# Patient Record
Sex: FEMALE | Race: OTHER | Employment: UNEMPLOYED | URBAN - METROPOLITAN AREA
[De-identification: names, ages, dates, MRNs, and addresses within clinical notes are randomized per-mention and may not be internally consistent; named-entity substitution may affect disease eponyms.]

---

## 2019-08-29 ENCOUNTER — TELEPHONE (OUTPATIENT)
Dept: OBGYN | Age: 24
End: 2019-08-29

## 2019-11-11 ENCOUNTER — APPOINTMENT (OUTPATIENT)
Dept: ULTRASOUND IMAGING | Age: 24
End: 2019-11-11
Payer: MEDICAID

## 2019-11-11 ENCOUNTER — HOSPITAL ENCOUNTER (EMERGENCY)
Age: 24
Discharge: HOME | End: 2019-11-11
Attending: EMERGENCY MEDICINE
Payer: MEDICAID

## 2019-11-11 VITALS
DIASTOLIC BLOOD PRESSURE: 88 MMHG | TEMPERATURE: 98.1 F | OXYGEN SATURATION: 100 % | RESPIRATION RATE: 16 BRPM | SYSTOLIC BLOOD PRESSURE: 128 MMHG | HEART RATE: 81 BPM

## 2019-11-11 DIAGNOSIS — N93.9 VAGINAL BLEEDING: Primary | ICD-10-CM

## 2019-11-11 LAB
-: ABNORMAL
ABSOLUTE EOS #: 0.26 K/UL (ref 0–0.44)
ABSOLUTE IMMATURE GRANULOCYTE: <0.03 K/UL (ref 0–0.3)
ABSOLUTE LYMPH #: 1.6 K/UL (ref 1.1–3.7)
ABSOLUTE MONO #: 0.73 K/UL (ref 0.1–1.2)
AMORPHOUS: ABNORMAL
BACTERIA: ABNORMAL
BASOPHILS # BLD: 1 % (ref 0–2)
BASOPHILS ABSOLUTE: 0.03 K/UL (ref 0–0.2)
BILIRUBIN URINE: NEGATIVE
CASTS UA: ABNORMAL /LPF (ref 0–2)
CASTS UA: ABNORMAL /LPF (ref 0–2)
COLOR: YELLOW
COMMENT UA: ABNORMAL
CRYSTALS, UA: ABNORMAL /HPF
DIFFERENTIAL TYPE: NORMAL
DIRECT EXAM: ABNORMAL
EOSINOPHILS RELATIVE PERCENT: 4 % (ref 1–4)
EPITHELIAL CELLS UA: ABNORMAL /HPF (ref 0–5)
GLUCOSE URINE: NEGATIVE
HCG QUALITATIVE: NEGATIVE
HCT VFR BLD CALC: 42.2 % (ref 36.3–47.1)
HEMOGLOBIN: 12.9 G/DL (ref 11.9–15.1)
IMMATURE GRANULOCYTES: 0 %
KETONES, URINE: NEGATIVE
LEUKOCYTE ESTERASE, URINE: ABNORMAL
LYMPHOCYTES # BLD: 26 % (ref 24–43)
Lab: ABNORMAL
MCH RBC QN AUTO: 26 PG (ref 25.2–33.5)
MCHC RBC AUTO-ENTMCNC: 30.6 G/DL (ref 28.4–34.8)
MCV RBC AUTO: 85.1 FL (ref 82.6–102.9)
MONOCYTES # BLD: 12 % (ref 3–12)
MUCUS: ABNORMAL
NITRITE, URINE: NEGATIVE
NRBC AUTOMATED: 0 PER 100 WBC
OTHER OBSERVATIONS UA: ABNORMAL
PDW BLD-RTO: 12.1 % (ref 11.8–14.4)
PH UA: 5.5 (ref 5–8)
PLATELET # BLD: 280 K/UL (ref 138–453)
PLATELET ESTIMATE: NORMAL
PMV BLD AUTO: 10.8 FL (ref 8.1–13.5)
PROTEIN UA: NEGATIVE
RBC # BLD: 4.96 M/UL (ref 3.95–5.11)
RBC # BLD: NORMAL 10*6/UL
RBC UA: ABNORMAL /HPF (ref 0–2)
RENAL EPITHELIAL, UA: ABNORMAL /HPF
SEG NEUTROPHILS: 57 % (ref 36–65)
SEGMENTED NEUTROPHILS ABSOLUTE COUNT: 3.43 K/UL (ref 1.5–8.1)
SPECIFIC GRAVITY UA: 1.02 (ref 1–1.03)
SPECIMEN DESCRIPTION: ABNORMAL
TRICHOMONAS: ABNORMAL
TURBIDITY: CLEAR
URINE HGB: ABNORMAL
UROBILINOGEN, URINE: NORMAL
WBC # BLD: 6.1 K/UL (ref 3.5–11.3)
WBC # BLD: NORMAL 10*3/UL
WBC UA: ABNORMAL /HPF (ref 0–5)
YEAST: ABNORMAL

## 2019-11-11 PROCEDURE — 87480 CANDIDA DNA DIR PROBE: CPT

## 2019-11-11 PROCEDURE — 93976 VASCULAR STUDY: CPT

## 2019-11-11 PROCEDURE — 87591 N.GONORRHOEAE DNA AMP PROB: CPT

## 2019-11-11 PROCEDURE — 87660 TRICHOMONAS VAGIN DIR PROBE: CPT

## 2019-11-11 PROCEDURE — 81001 URINALYSIS AUTO W/SCOPE: CPT

## 2019-11-11 PROCEDURE — 87491 CHLMYD TRACH DNA AMP PROBE: CPT

## 2019-11-11 PROCEDURE — 85025 COMPLETE CBC W/AUTO DIFF WBC: CPT

## 2019-11-11 PROCEDURE — 6370000000 HC RX 637 (ALT 250 FOR IP): Performed by: STUDENT IN AN ORGANIZED HEALTH CARE EDUCATION/TRAINING PROGRAM

## 2019-11-11 PROCEDURE — 87086 URINE CULTURE/COLONY COUNT: CPT

## 2019-11-11 PROCEDURE — 99284 EMERGENCY DEPT VISIT MOD MDM: CPT

## 2019-11-11 PROCEDURE — 84703 CHORIONIC GONADOTROPIN ASSAY: CPT

## 2019-11-11 PROCEDURE — 87510 GARDNER VAG DNA DIR PROBE: CPT

## 2019-11-11 PROCEDURE — 76830 TRANSVAGINAL US NON-OB: CPT

## 2019-11-11 RX ORDER — IBUPROFEN 400 MG/1
400 TABLET ORAL ONCE
Status: COMPLETED | OUTPATIENT
Start: 2019-11-11 | End: 2019-11-11

## 2019-11-11 RX ORDER — CEPHALEXIN 500 MG/1
500 CAPSULE ORAL 2 TIMES DAILY
Qty: 14 CAPSULE | Refills: 0 | Status: SHIPPED | OUTPATIENT
Start: 2019-11-11 | End: 2019-11-18

## 2019-11-11 RX ORDER — METRONIDAZOLE 500 MG/1
500 TABLET ORAL 2 TIMES DAILY
Qty: 14 TABLET | Refills: 0 | Status: SHIPPED | OUTPATIENT
Start: 2019-11-11 | End: 2019-11-18

## 2019-11-11 RX ADMIN — IBUPROFEN 400 MG: 400 TABLET, FILM COATED ORAL at 16:45

## 2019-11-11 ASSESSMENT — ENCOUNTER SYMPTOMS
DIARRHEA: 0
ABDOMINAL DISTENTION: 0
SHORTNESS OF BREATH: 0
NAUSEA: 1
VOMITING: 1
CONSTIPATION: 0
ABDOMINAL PAIN: 1

## 2019-11-11 ASSESSMENT — PAIN SCALES - GENERAL
PAINLEVEL_OUTOF10: 4
PAINLEVEL_OUTOF10: 6

## 2019-11-11 ASSESSMENT — PAIN DESCRIPTION - LOCATION: LOCATION: ABDOMEN

## 2019-11-12 LAB
CULTURE: NORMAL
Lab: NORMAL
SPECIMEN DESCRIPTION: NORMAL

## 2019-11-13 LAB
C TRACH DNA GENITAL QL NAA+PROBE: NEGATIVE
N. GONORRHOEAE DNA: NEGATIVE
SPECIMEN DESCRIPTION: NORMAL

## 2019-11-27 ENCOUNTER — OFFICE VISIT (OUTPATIENT)
Dept: OBGYN | Age: 24
End: 2019-11-27
Payer: MEDICAID

## 2019-11-27 ENCOUNTER — HOSPITAL ENCOUNTER (OUTPATIENT)
Age: 24
Setting detail: SPECIMEN
Discharge: HOME | End: 2019-11-27
Payer: MEDICAID

## 2019-11-27 VITALS
HEIGHT: 65 IN | SYSTOLIC BLOOD PRESSURE: 110 MMHG | HEART RATE: 71 BPM | WEIGHT: 171.5 LBS | BODY MASS INDEX: 28.57 KG/M2 | DIASTOLIC BLOOD PRESSURE: 70 MMHG

## 2019-11-27 DIAGNOSIS — Z97.5 IUD (INTRAUTERINE DEVICE) IN PLACE: Primary | ICD-10-CM

## 2019-11-27 DIAGNOSIS — Z00.00 PREVENTATIVE HEALTH CARE: ICD-10-CM

## 2019-11-27 DIAGNOSIS — Z30.09 FAMILY PLANNING: ICD-10-CM

## 2019-11-27 PROCEDURE — 96372 THER/PROPH/DIAG INJ SC/IM: CPT | Performed by: OBSTETRICS & GYNECOLOGY

## 2019-11-27 PROCEDURE — 1036F TOBACCO NON-USER: CPT | Performed by: OBSTETRICS & GYNECOLOGY

## 2019-11-27 PROCEDURE — 58301 REMOVE INTRAUTERINE DEVICE: CPT | Performed by: OBSTETRICS & GYNECOLOGY

## 2019-11-27 PROCEDURE — 99203 OFFICE O/P NEW LOW 30 MIN: CPT | Performed by: OBSTETRICS & GYNECOLOGY

## 2019-11-27 PROCEDURE — G8419 CALC BMI OUT NRM PARAM NOF/U: HCPCS | Performed by: OBSTETRICS & GYNECOLOGY

## 2019-11-27 PROCEDURE — G0008 ADMIN INFLUENZA VIRUS VAC: HCPCS | Performed by: OBSTETRICS & GYNECOLOGY

## 2019-11-27 PROCEDURE — 95115 IMMUNOTHERAPY ONE INJECTION: CPT | Performed by: OBSTETRICS & GYNECOLOGY

## 2019-11-27 PROCEDURE — G8427 DOCREV CUR MEDS BY ELIG CLIN: HCPCS | Performed by: OBSTETRICS & GYNECOLOGY

## 2019-11-27 PROCEDURE — 90651 9VHPV VACCINE 2/3 DOSE IM: CPT | Performed by: OBSTETRICS & GYNECOLOGY

## 2019-11-27 RX ORDER — PNV NO.95/FERROUS FUM/FOLIC AC 28MG-0.8MG
1 TABLET ORAL DAILY
Qty: 30 TABLET | Refills: 11 | Status: SHIPPED | OUTPATIENT
Start: 2019-11-27 | End: 2021-04-05 | Stop reason: SDUPTHER

## 2019-11-27 SDOH — HEALTH STABILITY: MENTAL HEALTH: HOW OFTEN DO YOU HAVE A DRINK CONTAINING ALCOHOL?: NEVER

## 2019-12-03 LAB — CYTOLOGY REPORT: NORMAL

## 2020-01-28 ENCOUNTER — TELEPHONE (OUTPATIENT)
Dept: OBGYN | Age: 25
End: 2020-01-28

## 2020-01-28 NOTE — TELEPHONE ENCOUNTER
Patient no showed for 1/28/20 appointment at [address] office. Writer left voice message for patient to call the office to reschedule appointment.

## 2020-07-08 ENCOUNTER — TELEPHONE (OUTPATIENT)
Dept: OBGYN | Age: 25
End: 2020-07-08

## 2020-07-13 ENCOUNTER — TELEPHONE (OUTPATIENT)
Dept: OBGYN | Age: 25
End: 2020-07-13

## 2020-07-13 ENCOUNTER — NURSE ONLY (OUTPATIENT)
Dept: OBGYN | Age: 25
End: 2020-07-13
Payer: MEDICAID

## 2020-07-13 VITALS
BODY MASS INDEX: 29.02 KG/M2 | DIASTOLIC BLOOD PRESSURE: 70 MMHG | WEIGHT: 174.4 LBS | TEMPERATURE: 97.2 F | HEART RATE: 72 BPM | SYSTOLIC BLOOD PRESSURE: 121 MMHG

## 2020-07-13 LAB
CONTROL: PRESENT
PREGNANCY TEST URINE, POC: POSITIVE

## 2020-07-13 PROCEDURE — 99211 OFF/OP EST MAY X REQ PHY/QHP: CPT | Performed by: OBSTETRICS & GYNECOLOGY

## 2020-07-13 PROCEDURE — 81025 URINE PREGNANCY TEST: CPT | Performed by: OBSTETRICS & GYNECOLOGY

## 2020-07-13 NOTE — TELEPHONE ENCOUNTER
Patient was seen for a NV today for UPT which was positive. She is requesting treatment for daily vomiting.   Please advise

## 2020-07-13 NOTE — PROGRESS NOTES
Pt present for UPT, LMP 5/27/20 ,UPT results positive, and results given to pt. Patient is c/o vomiting daily. Will request treatment for patient and she would like [facility] [address].

## 2020-07-15 ENCOUNTER — TELEPHONE (OUTPATIENT)
Dept: OBGYN | Age: 25
End: 2020-07-15

## 2020-07-15 NOTE — TELEPHONE ENCOUNTER
Attempted to reach patient as she requested a call regarding morning sickness in the early first trimester. Pt has not has dating and viability u/s yet. LVM for patient.

## 2021-01-18 ENCOUNTER — NURSE ONLY (OUTPATIENT)
Dept: OBGYN | Age: 26
End: 2021-01-18
Payer: MEDICAID

## 2021-01-18 VITALS
DIASTOLIC BLOOD PRESSURE: 75 MMHG | WEIGHT: 171 LBS | SYSTOLIC BLOOD PRESSURE: 127 MMHG | HEART RATE: 86 BPM | BODY MASS INDEX: 28.46 KG/M2 | TEMPERATURE: 97 F

## 2021-01-18 DIAGNOSIS — N91.2 AMENORRHEA: Primary | ICD-10-CM

## 2021-01-18 LAB
CONTROL: PRESENT
PREGNANCY TEST URINE, POC: POSITIVE

## 2021-01-18 PROCEDURE — 81025 URINE PREGNANCY TEST: CPT

## 2021-01-18 PROCEDURE — 99211 OFF/OP EST MAY X REQ PHY/QHP: CPT | Performed by: OBSTETRICS & GYNECOLOGY

## 2021-02-16 ENCOUNTER — TELEPHONE (OUTPATIENT)
Dept: OBGYN | Age: 26
End: 2021-02-16

## 2021-02-17 ENCOUNTER — TELEPHONE (OUTPATIENT)
Dept: OBGYN | Age: 26
End: 2021-02-17

## 2021-02-17 NOTE — TELEPHONE ENCOUNTER
CALLED PATIENT TO RESCHEDULE APPOINTMENT FROM 2/16/2021 DUE TO SNOW EMERGENCY, VOICE MAIL BOX IS NOT SET UP.

## 2021-02-23 ENCOUNTER — ANCILLARY PROCEDURE (OUTPATIENT)
Dept: OBGYN | Age: 26
End: 2021-02-23
Payer: MEDICAID

## 2021-02-23 DIAGNOSIS — Z36.89 ESTABLISH GESTATIONAL AGE, ULTRASOUND: ICD-10-CM

## 2021-02-23 PROCEDURE — 76805 OB US >/= 14 WKS SNGL FETUS: CPT | Performed by: RADIOLOGY

## 2021-02-25 ENCOUNTER — INITIAL PRENATAL (OUTPATIENT)
Dept: OBGYN CLINIC | Age: 26
End: 2021-02-25
Payer: MEDICAID

## 2021-02-25 ENCOUNTER — HOSPITAL ENCOUNTER (OUTPATIENT)
Age: 26
Setting detail: SPECIMEN
Discharge: HOME | End: 2021-02-25
Payer: MEDICAID

## 2021-02-25 VITALS
DIASTOLIC BLOOD PRESSURE: 80 MMHG | SYSTOLIC BLOOD PRESSURE: 132 MMHG | BODY MASS INDEX: 30.12 KG/M2 | HEART RATE: 84 BPM | WEIGHT: 181 LBS

## 2021-02-25 DIAGNOSIS — Z3A.15 15 WEEKS GESTATION OF PREGNANCY: ICD-10-CM

## 2021-02-25 DIAGNOSIS — O09.92 HRP (HIGH RISK PREGNANCY), SECOND TRIMESTER: ICD-10-CM

## 2021-02-25 DIAGNOSIS — O09.92 HRP (HIGH RISK PREGNANCY), SECOND TRIMESTER: Primary | ICD-10-CM

## 2021-02-25 PROCEDURE — G8484 FLU IMMUNIZE NO ADMIN: HCPCS | Performed by: SPECIALIST

## 2021-02-25 PROCEDURE — 99213 OFFICE O/P EST LOW 20 MIN: CPT | Performed by: SPECIALIST

## 2021-02-25 PROCEDURE — G8419 CALC BMI OUT NRM PARAM NOF/U: HCPCS | Performed by: SPECIALIST

## 2021-02-25 PROCEDURE — 1036F TOBACCO NON-USER: CPT | Performed by: SPECIALIST

## 2021-02-25 PROCEDURE — G8427 DOCREV CUR MEDS BY ELIG CLIN: HCPCS | Performed by: SPECIALIST

## 2021-02-25 RX ORDER — DOCUSATE SODIUM 100 MG/1
100 CAPSULE, LIQUID FILLED ORAL 2 TIMES DAILY
Qty: 60 CAPSULE | Refills: 0 | Status: SHIPPED | OUTPATIENT
Start: 2021-02-25 | End: 2021-03-27

## 2021-02-25 RX ORDER — VITAMIN A ACETATE, .BETA.-CAROTENE, ASCORBIC ACID, CHOLECALCIFEROL, .ALPHA.-TOCOPHEROL ACETATE, DL-, THIAMINE MONONITRATE, RIBOFLAVIN, NIACINAMIDE, PYRIDOXINE HYDROCHLORIDE, FOLIC ACID, CYANOCOBALAMIN, CALCIUM CARBONATE, FERROUS FUMARATE, ZINC OXIDE, AND CUPRIC OXIDE 2000; 2000; 120; 400; 22; 1.84; 3; 20; 10; 1; 12; 200; 27; 25; 2 [IU]/1; [IU]/1; MG/1; [IU]/1; MG/1; MG/1; MG/1; MG/1; MG/1; MG/1; UG/1; MG/1; MG/1; MG/1; MG/1
1 TABLET ORAL DAILY
Qty: 30 TABLET | Refills: 5 | Status: SHIPPED | OUTPATIENT
Start: 2021-02-25 | End: 2021-06-14 | Stop reason: SDUPTHER

## 2021-02-25 NOTE — PROGRESS NOTES
Yesika Garrido is a 23 y.o. female 15w3d        OB History    Para Term  AB Living   2       1     SAB TAB Ectopic Molar Multiple Live Births     1              # Outcome Date GA Lbr Valente/2nd Weight Sex Delivery Anes PTL Lv   2 Current            1 TAB                Chief Complaint   Patient presents with    Initial Prenatal Visit            Blood pressure 132/80, pulse 84, weight 181 lb (82.1 kg), last menstrual period 11/15/2020, not currently breastfeeding. The patient was seen and evaluated. There was N/A fetal movements. No contractions or leakage of fluid. Signs and symptoms of  labor as well as labor were reviewed. Dates were reviewed with the patient. The patient will return to the office for her next visit in 1 week. See antepartum flow sheet. There are no active problems to display for this patient. Diagnosis Orders   1. HRP (high risk pregnancy), second trimester  PRENATAL PROFILE I    HIV Screen    TSH with Reflex    Cystic Fibrosis    Sickle cell screen    Prenatal type and screen   2. 15 weeks gestation of pregnancy  PAP SMEAR    VAGINITIS DNA PROBE    C.trachomatis N.gonorrhoeae DNA     Orders Placed This Encounter   Medications    docusate sodium (COLACE) 100 MG capsule     Sig: Take 1 capsule by mouth 2 times daily     Dispense:  60 capsule     Refill:  0    Prenatal Vit-Fe Fumarate-FA (PNV PRENATAL PLUS MULTIVITAMIN) 27-1 MG TABS     Sig: Take 1 tablet by mouth daily     Dispense:  30 tablet     Refill:  5      Patient with chronic hypertension doing well. She complains of constipation. Will treat with Colace. Fetal heart rate auscultated at 154 bpm and fundal height is 17 cm. today. Pap and cultures done today. See Prenatal Physical tab for details. Patient advised to continue taking prenatal vitamins and to rest as necessary. Patient is not working at this time.   Patient was advised of information received from the task force regarding

## 2021-02-26 LAB
ABO/RH: NORMAL
ABSOLUTE EOS #: 0.12 K/UL (ref 0–0.44)
ABSOLUTE IMMATURE GRANULOCYTE: 0.08 K/UL (ref 0–0.3)
ABSOLUTE LYMPH #: 2.33 K/UL (ref 1.1–3.7)
ABSOLUTE MONO #: 0.79 K/UL (ref 0.1–1.2)
AMPHETAMINE SCREEN URINE: NEGATIVE
ANTIBODY SCREEN: NEGATIVE
BARBITURATE SCREEN URINE: NEGATIVE
BASOPHILS # BLD: 0 % (ref 0–2)
BASOPHILS ABSOLUTE: 0.03 K/UL (ref 0–0.2)
BENZODIAZEPINE SCREEN, URINE: NEGATIVE
BILIRUBIN URINE: NEGATIVE
BUPRENORPHINE URINE: NORMAL
C TRACH DNA GENITAL QL NAA+PROBE: NEGATIVE
CANNABINOID SCREEN URINE: NEGATIVE
COCAINE METABOLITE, URINE: NEGATIVE
COLOR: YELLOW
COMMENT UA: NORMAL
DIFFERENTIAL TYPE: ABNORMAL
DIRECT EXAM: NORMAL
EOSINOPHILS RELATIVE PERCENT: 1 % (ref 1–4)
GLUCOSE URINE: NEGATIVE
HCT VFR BLD CALC: 37.6 % (ref 36.3–47.1)
HEMOGLOBIN: 11.5 G/DL (ref 11.9–15.1)
HEPATITIS B SURFACE ANTIGEN: NONREACTIVE
HIV AG/AB: NONREACTIVE
IMMATURE GRANULOCYTES: 1 %
KETONES, URINE: NEGATIVE
LEUKOCYTE ESTERASE, URINE: NEGATIVE
LYMPHOCYTES # BLD: 22 % (ref 24–43)
Lab: NORMAL
MCH RBC QN AUTO: 26.1 PG (ref 25.2–33.5)
MCHC RBC AUTO-ENTMCNC: 30.6 G/DL (ref 28.4–34.8)
MCV RBC AUTO: 85.5 FL (ref 82.6–102.9)
MDMA URINE: NORMAL
METHADONE SCREEN, URINE: NEGATIVE
METHAMPHETAMINE, URINE: NORMAL
MONOCYTES # BLD: 7 % (ref 3–12)
N. GONORRHOEAE DNA: NEGATIVE
NITRITE, URINE: NEGATIVE
NRBC AUTOMATED: 0 PER 100 WBC
OPIATES, URINE: NEGATIVE
OXYCODONE SCREEN URINE: NEGATIVE
PDW BLD-RTO: 12.8 % (ref 11.8–14.4)
PH UA: 6 (ref 5–8)
PHENCYCLIDINE, URINE: NEGATIVE
PLATELET # BLD: 260 K/UL (ref 138–453)
PLATELET ESTIMATE: ABNORMAL
PMV BLD AUTO: 11.6 FL (ref 8.1–13.5)
PROPOXYPHENE, URINE: NORMAL
PROTEIN UA: NEGATIVE
RBC # BLD: 4.4 M/UL (ref 3.95–5.11)
RBC # BLD: ABNORMAL 10*6/UL
RUBV IGG SER QL: 49.4 IU/ML
SEG NEUTROPHILS: 69 % (ref 36–65)
SEGMENTED NEUTROPHILS ABSOLUTE COUNT: 7.51 K/UL (ref 1.5–8.1)
SICKLE CELL SCREEN: NEGATIVE
SPECIFIC GRAVITY UA: 1.01 (ref 1–1.03)
SPECIMEN DESCRIPTION: NORMAL
SPECIMEN DESCRIPTION: NORMAL
T. PALLIDUM, IGG: NONREACTIVE
TEST INFORMATION: NORMAL
THYROXINE, FREE: 1.12 NG/DL (ref 0.93–1.7)
TRICYCLIC ANTIDEPRESSANTS, UR: NORMAL
TSH SERPL DL<=0.05 MIU/L-ACNC: 0.29 MIU/L (ref 0.3–5)
TURBIDITY: CLEAR
URINE HGB: NEGATIVE
UROBILINOGEN, URINE: NORMAL
WBC # BLD: 10.9 K/UL (ref 3.5–11.3)
WBC # BLD: ABNORMAL 10*3/UL

## 2021-02-28 PROBLEM — O09.92 HRP (HIGH RISK PREGNANCY), SECOND TRIMESTER: Status: ACTIVE | Noted: 2021-02-28

## 2021-02-28 PROBLEM — Z3A.15 15 WEEKS GESTATION OF PREGNANCY: Status: ACTIVE | Noted: 2021-02-28

## 2021-03-05 ENCOUNTER — ROUTINE PRENATAL (OUTPATIENT)
Dept: OBGYN CLINIC | Age: 26
End: 2021-03-05
Payer: MEDICAID

## 2021-03-05 ENCOUNTER — HOSPITAL ENCOUNTER (OUTPATIENT)
Age: 26
Setting detail: SPECIMEN
Discharge: HOME | End: 2021-03-05
Payer: MEDICAID

## 2021-03-05 VITALS
HEART RATE: 88 BPM | WEIGHT: 180 LBS | SYSTOLIC BLOOD PRESSURE: 110 MMHG | BODY MASS INDEX: 29.95 KG/M2 | DIASTOLIC BLOOD PRESSURE: 71 MMHG | TEMPERATURE: 97 F

## 2021-03-05 DIAGNOSIS — Z3A.16 16 WEEKS GESTATION OF PREGNANCY: Primary | ICD-10-CM

## 2021-03-05 DIAGNOSIS — Z3A.16 16 WEEKS GESTATION OF PREGNANCY: ICD-10-CM

## 2021-03-05 LAB
CYSTIC FIBROSIS: NORMAL
CYTOLOGY REPORT: NORMAL

## 2021-03-05 PROCEDURE — G8427 DOCREV CUR MEDS BY ELIG CLIN: HCPCS | Performed by: SPECIALIST

## 2021-03-05 PROCEDURE — G8419 CALC BMI OUT NRM PARAM NOF/U: HCPCS | Performed by: SPECIALIST

## 2021-03-05 PROCEDURE — 99211 OFF/OP EST MAY X REQ PHY/QHP: CPT | Performed by: SPECIALIST

## 2021-03-05 PROCEDURE — 36415 COLL VENOUS BLD VENIPUNCTURE: CPT | Performed by: SPECIALIST

## 2021-03-05 NOTE — PROGRESS NOTES
Pt evaluated by RN and found to be doing well,  agree with nurse evaluation  The patient, Hodan David, identity was verified by name and MRN. Chief Complaint   Patient presents with    Routine Prenatal Visit     Patient is 16 weeks and 4 days gestation and is here for supervision of high risk pregnancy.  High Risk Pregnancy    Hypertension    Hypothyroidism     Estimated Date of Delivery: 8/16/21 16w4d  Any problems since last visit:none  Medication list reviewed and active medications noted. Patient is taking medications as directed. Other patient information given: consent maternal Serum Screening. Chaperone:offered, decline. Unable to void at this time. Patient is being seen as a nurse visit due to provider leaving for an emergency at the hospital.  Maternal quad screen collected. Patient has no complaints at this time. Patient encouraged to continue prenatal vitamins and keep next appointment. Patient was in the office today for a Maternal quad screen. Draw per physician order using sterile technique.   Drawn from the Right antecubital.  Pt evaluated by RN and found to be doing well,  agree with nurse evaluation

## 2021-03-09 LAB
AFP INTERPRETATION: NORMAL
AFP MOM: 0.45
AFP QUAD INTERPRETATION: NORMAL
AFP SPECIMEN: NORMAL
AFP: 14 NG/ML
DATE OF BIRTH: NORMAL
DATING METHOD: NORMAL
DETERMINED BY: NORMAL
DIABETIC: NORMAL
DIMERIC INHIBIN A: 129 PG/ML
DONOR EGG?: NORMAL
DUE DATE: NORMAL
ESTIMATED DUE DATE: NORMAL
FAMILY HISTORY NTD: NORMAL
GESTATIONAL AGE: NORMAL
HISTORY OF ANEUPLOIDY?: NO
IN VITRO FERTILIZATION: NO
INHIBIN A MOM: 0.98
INSULIN REQ DIABETES: NO
LAST MENSTRUAL PERIOD: NORMAL
MATERNAL AGE AT EDD: 24.3 YR
MATERNAL WEIGHT: 180
MOM FOR HCG, 2ND TRIMESTER: 2.74
MONOCHORIONIC TWINS: NORMAL
NUMBER OF FETUSES: NORMAL
PATIENT WEIGHT UNITS: NORMAL
PATIENT WEIGHT: NORMAL
PATIENT'S HCG, TRI 2: NORMAL IU/L
PREV TRISOMY PREG: NORMAL
RACE (MATERNAL): NORMAL
RACE: NORMAL
REPEAT SPECIMEN?: NO
SMOKING: NO
SMOKING: NORMAL
UE3 MOM: 0.81
UE3 VALUE: 0.95 NG/ML
VALPROIC/CARBAMAZEP: NORMAL
ZZ NTE CLEAN UP: HISTORY: NO

## 2021-03-10 ENCOUNTER — ROUTINE PRENATAL (OUTPATIENT)
Dept: OBGYN CLINIC | Age: 26
End: 2021-03-10
Payer: MEDICAID

## 2021-03-10 VITALS — TEMPERATURE: 97.3 F

## 2021-03-10 DIAGNOSIS — O28.0 ABNORMAL QUAD SCREEN: ICD-10-CM

## 2021-03-10 DIAGNOSIS — O09.92 HRP (HIGH RISK PREGNANCY), SECOND TRIMESTER: ICD-10-CM

## 2021-03-10 DIAGNOSIS — Z3A.17 17 WEEKS GESTATION OF PREGNANCY: Primary | ICD-10-CM

## 2021-03-10 PROCEDURE — 99213 OFFICE O/P EST LOW 20 MIN: CPT | Performed by: SPECIALIST

## 2021-03-10 PROCEDURE — G8484 FLU IMMUNIZE NO ADMIN: HCPCS | Performed by: SPECIALIST

## 2021-03-10 PROCEDURE — 1036F TOBACCO NON-USER: CPT | Performed by: SPECIALIST

## 2021-03-10 PROCEDURE — G8419 CALC BMI OUT NRM PARAM NOF/U: HCPCS | Performed by: SPECIALIST

## 2021-03-10 PROCEDURE — G8427 DOCREV CUR MEDS BY ELIG CLIN: HCPCS | Performed by: SPECIALIST

## 2021-03-10 NOTE — PATIENT INSTRUCTIONS
Patient Education        Weeks 14 to 25 of Your Pregnancy: Care Instructions  Your Care Instructions     During this time, you may start to \"show,\" so that you look pregnant to people around you. You may also notice some changes in your skin, such as itchy spots on your palms or acne on your face. Your baby is now able to pass urine, and your baby's first stool (meconium) is starting to collect in his or her intestines. Hair is also beginning to grow on your baby's head. At your next visit, between weeks 18 and 20, your doctor may do an ultrasound test. The test allows your doctor to check for certain problems. Your doctor can also tell the sex of your baby. This is a good time to think about whether you want to know whether your baby is a boy or a girl. Talk to your doctor about getting a flu shot to help keep you healthy during your pregnancy. As your pregnancy moves along, it is common to worry or feel anxious. Your body is changing a lot. And you are thinking about giving birth, the health of your baby, and becoming a parent. You can learn to cope with any anxiety and stress you feel. Follow-up care is a key part of your treatment and safety. Be sure to make and go to all appointments, and call your doctor if you are having problems. It's also a good idea to know your test results and keep a list of the medicines you take. How can you care for yourself at home? Reduce stress    · Ask for help with cooking and housekeeping.     · Figure out who or what causes your stress. Avoid these people or situations as much as possible.     · Relax every day. Taking 10- to 15-minute breaks can make a big difference. Take a walk, listen to music, or take a warm bath.     · Learn relaxation techniques at prenatal or yoga class. Or buy a relaxation tape.     · List your fears about having a baby and becoming a parent. Share the list with someone you trust. Decide which worries are really small, and try to let them go. Exercise    · If you did not exercise much before pregnancy, start slowly. Walking is best. [employer], and do a little more every day.     · Brisk walking, easy jogging, low-impact aerobics, water aerobics, and yoga are good choices. Some sports, such as scuba diving, horseback riding, downhill skiing, gymnastics, and water skiing, are not a good idea.     · Try to do at least 2½ hours a week of moderate exercise, such as a fast walk. One way to do this is to be active 30 minutes a day, at least 5 days a week. It's fine to be active in blocks of 10 minutes or more throughout your day and week.     · Wear loose clothing. And wear shoes and a bra that provide good support.     · Warm up and cool down to start and finish your exercise.     · If you want to use weights, be sure to use light weights. They reduce stress on your joints. Stay at the best weight for you    · Experts recommend that you gain about 1 pound a month during the first 3 months of your pregnancy.     · Experts recommend that you gain about 1 pound a week during your last 6 months of pregnancy, for a total weight gain of 25 to 35 pounds.     · If you are underweight, you will need to gain more weight (about 28 to 40 pounds).     · If you are overweight, you may not need to gain as much weight (about 15 to 25 pounds).     · If you are gaining weight too fast, use common sense. Exercise every day, and limit sweets, fast foods, and fats. Choose lean meats, fruits, and vegetables.     · If you are having twins or more, your doctor may refer you to a dietitian. Where can you learn more? Go to https://Patch of Landkameron.healthRedRover. org and sign in to your [employer]Manias account. Enter Q860 in the Flickr box to learn more about \"Weeks 14 to 18 of Your Pregnancy: Care Instructions. \"     If you do not have an account, please click on the \"Sign Up Now\" link.   Current as of: February 11, 2020               Content Version: 12.6  © 3010-5479 Healthwise, Incorporated. Care instructions adapted under license by [facility] ([facility]). If you have questions about a medical condition or this instruction, always ask your healthcare professional. [address] any warranty or liability for your use of this information. Patient Education        Learning About When to Call Your Doctor During Pregnancy (Up to 20 Weeks)  Your Care Instructions     It's common to have concerns about what might be a problem during pregnancy. Although most pregnant women don't have any serious problems, it's important to know when to call your doctor if you have certain symptoms. These are general suggestions. Your doctor may give you some more information about when to call. When to call your doctor (up to 20 weeks)  Call 911 anytime you think you may need emergency care. For example, call if:  · You passed out (lost consciousness). Call your doctor now or seek immediate medical care if:  · You have a fever. · You have vaginal bleeding. · You are dizzy or lightheaded, or you feel like you may faint. · You have symptoms of a urinary tract infection. These may include:  ? Pain or burning when you urinate. ? A frequent need to urinate without being able to pass much urine. ? Pain in the flank, which is just below the rib cage and above the waist on either side of the back. ? Blood in your urine. · You have belly pain. · You think you are having contractions. · You have a sudden release of fluid from your vagina. Watch closely for changes in your health, and be sure to contact your doctor if:  · You have vaginal discharge that smells bad. · You have other concerns about your pregnancy. Follow-up care is a key part of your treatment and safety. Be sure to make and go to all appointments, and call your doctor if you are having problems. It's also a good idea to know your test results and keep a list of the medicines you take.   Where can you learn more?  Go to [email or web address].healthGroup [email or web address] and sign in to your "[employer] account. Enter G801 in the [facility] box to learn more about \"Learning About When to Call Your Doctor During Pregnancy (Up to 20 Weeks). \"     If you do not have an account, please click on the \"Sign Up Now\" link. Current as of: February 11, 2020               Content Version: 12.6  © 2006-2020 [employer], Incorporated. Care instructions adapted under license by [facility] ([facility]). If you have questions about a medical condition or this instruction, always ask your healthcare professional. [address] any warranty or liability for your use of this information.

## 2021-03-10 NOTE — PROGRESS NOTES
Mary Pierre is a 23 y.o. female 17w2d        OB History    Para Term  AB Living   2       1     SAB TAB Ectopic Molar Multiple Live Births     1              # Outcome Date GA Lbr Valente/2nd Weight Sex Delivery Anes PTL Lv   2 Current            1 TAB                No chief complaint on file. Temperature 97.3 °F (36.3 °C), temperature source Infrared, last menstrual period 11/15/2020, not currently breastfeeding. The patient was seen and evaluated. There was N/A fetal movements. No contractions or leakage of fluid. Signs and symptoms of  labor as well as labor were reviewed. Dates were reviewed with the patient. The patient will return to the office for her next visit in 1 week. See antepartum flow sheet. Patient Active Problem List    Diagnosis Date Noted    17 weeks gestation of pregnancy 03/10/2021    Abnormal quad screen 03/10/2021    HRP (high risk pregnancy), second trimester 2021    15 weeks gestation of pregnancy 2021        Diagnosis Orders   1. 17 weeks gestation of pregnancy  Bronwyn Mueller MD, Maternal Fetal Medicine, [place]   2. Abnormal quad screen  Bronwyn Mueller MD, Maternal Fetal Medicine, [place]   3. HRP (high risk pregnancy), second trimester         Patient with abnormal quad screen showing positive for downs syndrome. Explained to patient that this test is only a screening test that allows us to take a closer look at the baby. Discussed that she will be referred to [facility] for further evaluation. All questions were answered. Fetal heart rate auscultated at 146 bpm and fundal height is 17 cm. today. Patient advised to continue taking prenatal vitamins and to rest as necessary. Sharlene Case, am scribing for, and in the presence of Dr. Any Tobar. Electronically signed by: Wilber Fitzgerald 3/10/21 3:52 PM   I agree to the above documentation placed by my scribe Wilber Fitzgerald.  I

## 2021-03-11 ENCOUNTER — TELEPHONE (OUTPATIENT)
Dept: OBGYN CLINIC | Age: 26
End: 2021-03-11

## 2021-03-11 NOTE — TELEPHONE ENCOUNTER
Patient notified of Positive quad screen at appointment on 03/10/2021. Patient referred to [facility].

## 2021-03-11 NOTE — TELEPHONE ENCOUNTER
----- Message from Eren Christina MD sent at 3/9/2021  3:55 PM EST -----  Pt needs ref to [facility]

## 2021-03-17 ENCOUNTER — ROUTINE PRENATAL (OUTPATIENT)
Dept: OBGYN CLINIC | Age: 26
End: 2021-03-17
Payer: MEDICAID

## 2021-03-17 VITALS
SYSTOLIC BLOOD PRESSURE: 151 MMHG | TEMPERATURE: 97 F | WEIGHT: 186 LBS | HEART RATE: 114 BPM | BODY MASS INDEX: 30.95 KG/M2 | DIASTOLIC BLOOD PRESSURE: 85 MMHG

## 2021-03-17 DIAGNOSIS — Z3A.18 18 WEEKS GESTATION OF PREGNANCY: Primary | ICD-10-CM

## 2021-03-17 DIAGNOSIS — O28.0 ABNORMAL QUAD SCREEN: ICD-10-CM

## 2021-03-17 DIAGNOSIS — O09.92 HRP (HIGH RISK PREGNANCY), SECOND TRIMESTER: ICD-10-CM

## 2021-03-17 PROCEDURE — G8419 CALC BMI OUT NRM PARAM NOF/U: HCPCS | Performed by: SPECIALIST

## 2021-03-17 PROCEDURE — 99213 OFFICE O/P EST LOW 20 MIN: CPT | Performed by: SPECIALIST

## 2021-03-17 PROCEDURE — G8484 FLU IMMUNIZE NO ADMIN: HCPCS | Performed by: SPECIALIST

## 2021-03-17 PROCEDURE — 1036F TOBACCO NON-USER: CPT | Performed by: SPECIALIST

## 2021-03-17 PROCEDURE — G8427 DOCREV CUR MEDS BY ELIG CLIN: HCPCS | Performed by: SPECIALIST

## 2021-03-17 RX ORDER — LABETALOL 100 MG/1
100 TABLET, FILM COATED ORAL 2 TIMES DAILY
Qty: 30 TABLET | Refills: 2 | Status: SHIPPED | OUTPATIENT
Start: 2021-03-17 | End: 2021-04-05 | Stop reason: SDUPTHER

## 2021-03-17 NOTE — PATIENT INSTRUCTIONS
Patient Education        Weeks 18 to 22 of Your Pregnancy: Care Instructions  Overview     Your baby is continuing to develop quickly. Sometime between 18 and 22 weeks, you'll probably start to feel your baby move. At first, these small fetal movements feel like fluttering or \"butterflies. \" Some women say that they feel like gas bubbles. As your baby grows, these movements will become stronger. You may also notice that your baby hiccups. Babies at this stage can now suck their thumbs. You may find that your nausea and fatigue are gone. You may feel better overall and have more energy than you did in your first trimester. But you might now also have some new discomforts, like sleep problems or leg cramps. Talk to your doctor about things you can do at home to ease these problems. Follow-up care is a key part of your treatment and safety. Be sure to make and go to all appointments, and call your doctor if you are having problems. It's also a good idea to know your test results and keep a list of the medicines you take. How can you care for yourself at home? Ease sleep problems  · Avoid caffeine in drinks or chocolate late in the day. · Get some exercise every day. · Take a warm shower or bath before bed. · Have a light snack or glass of milk at bedtime. · Do relaxation exercises in bed to calm your mind and body. · Support your legs and back with extra pillows. Try a pillow between your legs if you sleep on your side. · Do not use sleeping pills or alcohol. They could harm your baby. Ease leg cramps  · Do not massage your calf during the cramp. · Sit on a firm bed or chair. Straighten your leg, and bend your foot (flex your ankle) slowly upward, toward your knee. Bend your toes up and down. · Stand on a cool, flat surface. Stretch your toes upward, and take small steps walking on your heels. · Use a heating pad or hot water bottle to help with muscle ache.   Prevent leg cramps  · Be sure to get enough calcium. If you are worried that you are not getting enough, talk to your doctor. · Exercise every day, and stretch your legs before bed. · Take a warm bath before bed, and try leg warmers at night. Where can you learn more? Go to https://iggy.healthKochAbo. org and sign in to your Digilab account. Enter A017 in the [facility] box to learn more about \"Weeks 18 to 22 of Your Pregnancy: Care Instructions. \"     If you do not have an account, please click on the \"Sign Up Now\" link. Current as of: October 8, 2020               Content Version: 12.8  © 8795-7516 [employer]. Care instructions adapted under license by [facility] ([facility]). If you have questions about a medical condition or this instruction, always ask your healthcare professional. [place] [address] any warranty or liability for your use of this information. Patient Education        Learning About When to Call Your Doctor During Pregnancy (Up to 20 Weeks)  Your Care Instructions     It's common to have concerns about what might be a problem during pregnancy. Although most pregnant women don't have any serious problems, it's important to know when to call your doctor if you have certain symptoms. These are general suggestions. Your doctor may give you some more information about when to call. When to call your doctor (up to 20 weeks)  Call 911 anytime you think you may need emergency care. For example, call if:  · You passed out (lost consciousness). Call your doctor now or seek immediate medical care if:  · You have a fever. · You have vaginal bleeding. · You are dizzy or lightheaded, or you feel like you may faint. · You have symptoms of a urinary tract infection. These may include:  ? Pain or burning when you urinate. ? A frequent need to urinate without being able to pass much urine.   ? Pain in the flank, which is just below the rib cage and above the waist on either side of the back.  ? Blood in your urine. · You have belly pain. · You think you are having contractions. · You have a sudden release of fluid from your vagina. Watch closely for changes in your health, and be sure to contact your doctor if:  · You have vaginal discharge that smells bad. · You have other concerns about your pregnancy. Follow-up care is a key part of your treatment and safety. Be sure to make and go to all appointments, and call your doctor if you are having problems. It's also a good idea to know your test results and keep a list of the medicines you take. Where can you learn more? Go to https://Veracity Medical Solutionspepiceweb.healthColumbia Gorge Teen Camps. org and sign in to your [employer] account. Enter L399 in the [employer] box to learn more about \"Learning About When to Call Your Doctor During Pregnancy (Up to 20 Weeks). \"     If you do not have an account, please click on the \"Sign Up Now\" link. Current as of: October 8, 2020               Content Version: 12.8  © 2006-2021 Healthwise, Incorporated. Care instructions adapted under license by [facility] ([facility]). If you have questions about a medical condition or this instruction, always ask your healthcare professional. [place] [address] any warranty or liability for your use of this information.

## 2021-03-17 NOTE — PROGRESS NOTES
Colby Rosales is a 23 y.o. female 18w2d        OB History    Para Term  AB Living   2       1     SAB TAB Ectopic Molar Multiple Live Births     1              # Outcome Date GA Lbr Valente/2nd Weight Sex Delivery Anes PTL Lv   2 Current            1 TAB                Chief Complaint   Patient presents with    Routine Prenatal Visit            Blood pressure (!) 151/85, pulse 114, temperature 97 °F (36.1 °C), weight 186 lb (84.4 kg), last menstrual period 11/15/2020, not currently breastfeeding. The patient was seen and evaluated. There was Positive fetal movements. No contractions or leakage of fluid. Signs and symptoms of  labor as well as labor were reviewed. Dates were reviewed with the patient. The patient will return to the office for her next visit in 1 week. See antepartum flow sheet. Patient Active Problem List    Diagnosis Date Noted    18 weeks gestation of pregnancy 2021    17 weeks gestation of pregnancy 03/10/2021    Abnormal quad screen 03/10/2021    HRP (high risk pregnancy), second trimester 2021    15 weeks gestation of pregnancy 2021        Diagnosis Orders   1. 18 weeks gestation of pregnancy     2. Abnormal quad screen     3. HRP (high risk pregnancy), second trimester       Orders Placed This Encounter   Medications    labetalol (NORMODYNE) 100 MG tablet     Sig: Take 1 tablet by mouth 2 times daily     Dispense:  30 tablet     Refill:  2      Patient with elevated blood pressure. Patient advised to start taking Labetalol. Patient has brought family with her to this appointment and abnormal quad screening was explained again. Patient and family reassured that this is a screening test and that further testing and evaluation needs to be done. She has been scheduled with [address] [place] [address] for evaluation of abnormal quad screening, but not until 2021.   Patient requests referral to a different [facility] specialist because she is very anxious and cannot wait that long to be seen. Fetal heart rate auscultated at 158 bpm and fundal height is 20 cm. today. Patient advised to continue taking prenatal vitamins and to rest as necessary. Bk Brooks am scribing for, and in the presence of Dr. Darryl Ramos. Electronically signed by: Emir Copeland 3/17/21 3:33 PM   I agree to the above documentation placed by my scribe Emir Copeland. I reviewed the scribe's note and agree with the documented findings and plan of care. Any areas of disagreement are noted on the chart. I have personally evaluated this patient. Additional findings are as noted. I agree with the chief complaint, past medical history, past surgical history, allergies, medications, social and family history as documented unless otherwise noted below.      Electronically signed by Darryl Ramos MD on 3/18/2021 at 11:27 AM

## 2021-03-18 ENCOUNTER — TELEPHONE (OUTPATIENT)
Dept: OBGYN CLINIC | Age: 26
End: 2021-03-18

## 2021-03-25 ENCOUNTER — ROUTINE PRENATAL (OUTPATIENT)
Dept: OBGYN CLINIC | Age: 26
End: 2021-03-25
Payer: MEDICAID

## 2021-03-25 VITALS
SYSTOLIC BLOOD PRESSURE: 123 MMHG | BODY MASS INDEX: 30.95 KG/M2 | DIASTOLIC BLOOD PRESSURE: 74 MMHG | WEIGHT: 186 LBS | HEART RATE: 82 BPM

## 2021-03-25 DIAGNOSIS — Z3A.19 19 WEEKS GESTATION OF PREGNANCY: Primary | ICD-10-CM

## 2021-03-25 DIAGNOSIS — O28.0 ABNORMAL QUAD SCREEN: ICD-10-CM

## 2021-03-25 DIAGNOSIS — O09.92 HRP (HIGH RISK PREGNANCY), SECOND TRIMESTER: ICD-10-CM

## 2021-03-25 PROCEDURE — G8427 DOCREV CUR MEDS BY ELIG CLIN: HCPCS | Performed by: SPECIALIST

## 2021-03-25 PROCEDURE — 1036F TOBACCO NON-USER: CPT | Performed by: SPECIALIST

## 2021-03-25 PROCEDURE — G8419 CALC BMI OUT NRM PARAM NOF/U: HCPCS | Performed by: SPECIALIST

## 2021-03-25 PROCEDURE — G8484 FLU IMMUNIZE NO ADMIN: HCPCS | Performed by: SPECIALIST

## 2021-03-25 PROCEDURE — 99213 OFFICE O/P EST LOW 20 MIN: CPT | Performed by: SPECIALIST

## 2021-03-25 RX ORDER — ASPIRIN 81 MG/1
81 TABLET, CHEWABLE ORAL DAILY
Status: ON HOLD | COMMUNITY
Start: 2021-03-19 | End: 2021-07-27 | Stop reason: HOSPADM

## 2021-03-25 RX ORDER — BUTALBITAL, ACETAMINOPHEN AND CAFFEINE 50; 325; 40 MG/1; MG/1; MG/1
1 TABLET ORAL EVERY 4 HOURS PRN
COMMUNITY
Start: 2021-03-19 | End: 2021-05-06 | Stop reason: SDUPTHER

## 2021-03-25 NOTE — PROGRESS NOTES
Chaya Rojas is a 23 y.o. female 19w3d        OB History    Para Term  AB Living   2       1     SAB TAB Ectopic Molar Multiple Live Births     1              # Outcome Date GA Lbr Valente/2nd Weight Sex Delivery Anes PTL Lv   2 Current            1 TAB                Chief Complaint   Patient presents with    High Risk Pregnancy        Blood pressure 123/74, pulse 82, weight 186 lb (84.4 kg), last menstrual period 11/15/2020, not currently breastfeeding. The patient was seen and evaluated. There was positive fetal movements. No contractions or leakage of fluid. Signs and symptoms of  labor as well as labor were reviewed. The patient's anatomy ultrasound has been scheduled at [facility]. The S/S of Pre-Eclampsia were reviewed with the patient in detail. She is to report any of these if they occur. She currently denies any of these. The patient is RH positive Rhogam Ordered: Not indicated. Patient Active Problem List    Diagnosis Date Noted    19 weeks gestation of pregnancy 2021    18 weeks gestation of pregnancy 2021    17 weeks gestation of pregnancy 03/10/2021    Abnormal quad screen 03/10/2021    HRP (high risk pregnancy), second trimester 2021    15 weeks gestation of pregnancy 2021        Diagnosis Orders   1. 19 weeks gestation of pregnancy     2. HRP (high risk pregnancy), second trimester     3. Abnormal quad screen         Patient has been using blood pressure medication as prescribed. She has been evaluated at [facility] for abnormal quad screen and is scheduled for her second trimester ultrasound there. Fetal heart rate auscultated at 152 bpm and fundal height is 19 cm. today. Patient advised to continue taking prenatal vitamins and to rest as necessary. The patient will return to the office for her next visit in 1 week. See antepartum flow sheet.      Lyly Quintero am scribing for, and in the presence of Dr. Anay Baird. Alexis. Electronically signed by: Rupinder Bradley 3/25/21 2:54 PM   I agree to the above documentation placed by my scribe Rupinder Bradley. I reviewed the scribe's note and agree with the documented findings and plan of care. Any areas of disagreement are noted on the chart. I have personally evaluated this patient. Additional findings are as noted. I agree with the chief complaint, past medical history, past surgical history, allergies, medications, social and family history as documented unless otherwise noted below.      Electronically signed by Negro Walker MD on 3/26/2021 at 1:50 AM

## 2021-04-05 DIAGNOSIS — Z30.09 FAMILY PLANNING: ICD-10-CM

## 2021-04-05 RX ORDER — LABETALOL 100 MG/1
100 TABLET, FILM COATED ORAL 2 TIMES DAILY
Qty: 30 TABLET | Refills: 2 | Status: SHIPPED | OUTPATIENT
Start: 2021-04-05 | End: 2021-04-29 | Stop reason: SDUPTHER

## 2021-04-05 RX ORDER — PNV NO.95/FERROUS FUM/FOLIC AC 28MG-0.8MG
1 TABLET ORAL DAILY
Qty: 30 TABLET | Refills: 11 | Status: SHIPPED | OUTPATIENT
Start: 2021-04-05 | End: 2021-05-20

## 2021-04-05 NOTE — TELEPHONE ENCOUNTER
Called in Prenatal vit-fe fumarate-fa 27-0.8 tabs 1 tab by mouth daily #30 and   Normodyne 100 mg tab 1 tab by mouth 2 times daily #60. Called into [facility] [place] @ [phone]. Lm on prescription line @ 4:17pm.  Pt notified.

## 2021-04-05 NOTE — TELEPHONE ENCOUNTER
Patient is in [facility] and needs her medications sent here. She is using [address] [phone]. She is out of medication.   Thanks

## 2021-04-15 ENCOUNTER — TELEPHONE (OUTPATIENT)
Dept: OBGYN CLINIC | Age: 26
End: 2021-04-15

## 2021-04-20 ENCOUNTER — TELEPHONE (OUTPATIENT)
Dept: OBGYN CLINIC | Age: 26
End: 2021-04-20

## 2021-04-29 ENCOUNTER — ROUTINE PRENATAL (OUTPATIENT)
Dept: OBGYN CLINIC | Age: 26
End: 2021-04-29
Payer: MEDICARE

## 2021-04-29 VITALS
SYSTOLIC BLOOD PRESSURE: 130 MMHG | HEART RATE: 86 BPM | BODY MASS INDEX: 33.12 KG/M2 | WEIGHT: 199 LBS | DIASTOLIC BLOOD PRESSURE: 77 MMHG

## 2021-04-29 DIAGNOSIS — O16.2 HYPERTENSION DURING PREGNANCY IN SECOND TRIMESTER, UNSPECIFIED HYPERTENSION IN PREGNANCY TYPE: ICD-10-CM

## 2021-04-29 DIAGNOSIS — O09.92 HRP (HIGH RISK PREGNANCY), SECOND TRIMESTER: Primary | ICD-10-CM

## 2021-04-29 DIAGNOSIS — Z3A.24 24 WEEKS GESTATION OF PREGNANCY: ICD-10-CM

## 2021-04-29 DIAGNOSIS — O28.0 ABNORMAL QUAD SCREEN: ICD-10-CM

## 2021-04-29 PROCEDURE — G8427 DOCREV CUR MEDS BY ELIG CLIN: HCPCS | Performed by: SPECIALIST

## 2021-04-29 PROCEDURE — 1036F TOBACCO NON-USER: CPT | Performed by: SPECIALIST

## 2021-04-29 PROCEDURE — 99213 OFFICE O/P EST LOW 20 MIN: CPT | Performed by: SPECIALIST

## 2021-04-29 PROCEDURE — G8419 CALC BMI OUT NRM PARAM NOF/U: HCPCS | Performed by: SPECIALIST

## 2021-04-29 RX ORDER — LABETALOL 100 MG/1
100 TABLET, FILM COATED ORAL 2 TIMES DAILY
Qty: 30 TABLET | Refills: 2 | Status: SHIPPED | OUTPATIENT
Start: 2021-04-29 | End: 2021-05-20 | Stop reason: SDUPTHER

## 2021-04-29 RX ORDER — VITAMIN A ACETATE, .BETA.-CAROTENE, ASCORBIC ACID, CHOLECALCIFEROL, .ALPHA.-TOCOPHEROL ACETATE, DL-, THIAMINE MONONITRATE, RIBOFLAVIN, NIACINAMIDE, PYRIDOXINE HYDROCHLORIDE, FOLIC ACID, CYANOCOBALAMIN, CALCIUM CARBONATE, FERROUS FUMARATE, ZINC OXIDE, AND CUPRIC OXIDE 2000; 2000; 120; 400; 22; 1.84; 3; 20; 10; 1; 12; 200; 27; 25; 2 [IU]/1; [IU]/1; MG/1; [IU]/1; MG/1; MG/1; MG/1; MG/1; MG/1; MG/1; UG/1; MG/1; MG/1; MG/1; MG/1
1 TABLET ORAL DAILY
Qty: 30 TABLET | Refills: 5 | Status: SHIPPED | OUTPATIENT
Start: 2021-04-29 | End: 2021-06-10 | Stop reason: SDUPTHER

## 2021-04-29 NOTE — PROGRESS NOTES
Nitesh Cuellar is a 23 y.o. female 18w2d        OB History    Para Term  AB Living   2       1     SAB TAB Ectopic Molar Multiple Live Births     1              # Outcome Date GA Lbr Valente/2nd Weight Sex Delivery Anes PTL Lv   2 Current            1 TAB                Chief Complaint   Patient presents with    High Risk Pregnancy        Blood pressure 130/77, pulse 86, weight 199 lb (90.3 kg), last menstrual period 11/15/2020, not currently breastfeeding. The patient was seen and evaluated. There was positive fetal movements. No contractions or leakage of fluid. Signs and symptoms of  labor as well as labor were reviewed. The patient's anatomy ultrasound was done at [facility]. The S/S of Pre-Eclampsia were reviewed with the patient in detail. She is to report any of these if they occur. She currently denies any of these. The patient is RH positive Rhogam Ordered: Not indicated. Patient Active Problem List    Diagnosis Date Noted    24 weeks gestation of pregnancy 2021    Hypertension during pregnancy in second trimester 2021    19 weeks gestation of pregnancy 2021    18 weeks gestation of pregnancy 2021    17 weeks gestation of pregnancy 03/10/2021    Abnormal quad screen 03/10/2021    HRP (high risk pregnancy), second trimester 2021    15 weeks gestation of pregnancy 2021        Diagnosis Orders   1. HRP (high risk pregnancy), second trimester     2. 24 weeks gestation of pregnancy     3. Abnormal quad screen     4.  Hypertension during pregnancy in second trimester, unspecified hypertension in pregnancy type       Orders Placed This Encounter   Medications    labetalol (NORMODYNE) 100 MG tablet     Sig: Take 1 tablet by mouth 2 times daily     Dispense:  30 tablet     Refill:  2    Prenatal Vit-Fe Fumarate-FA (PNV PRENATAL PLUS MULTIVITAMIN) 27-1 MG TABS     Sig: Take 1 tablet by mouth daily     Dispense:  30 tablet Refill:  5      Patient states that she has not been coming to appointments because she was in [facility] with her mother. Patient is living in [place]. She has not been taking blood pressure medication because she could not pick them up in [place]. Prescription was sent again and patient was advised to start taking this as soon as possible. Fetal heart rate auscultated at 150 bpm and fundal height is 24 cm. today. Patient advised to continue taking prenatal vitamins and to rest as necessary. The patient will return to the office for her next visit in 1 week. See antepartum flow sheet. Tressa Adame am scribing for, and in the presence of Dr. Dian Nolasco. Electronically signed by: Kaden Bal 4/29/21 3:00 PM   I agree to the above documentation placed by my scribe Kaden Bal. I reviewed the scribe's note and agree with the documented findings and plan of care. Any areas of disagreement are noted on the chart. I have personally evaluated this patient. Additional findings are as noted. I agree with the chief complaint, past medical history, past surgical history, allergies, medications, social and family history as documented unless otherwise noted below.      Electronically signed by Dian Nolasco MD on 4/30/2021 at 3:18 AM

## 2021-05-06 ENCOUNTER — ROUTINE PRENATAL (OUTPATIENT)
Dept: OBGYN CLINIC | Age: 26
End: 2021-05-06
Payer: MEDICARE

## 2021-05-06 VITALS
BODY MASS INDEX: 33.45 KG/M2 | WEIGHT: 201 LBS | HEART RATE: 88 BPM | SYSTOLIC BLOOD PRESSURE: 146 MMHG | DIASTOLIC BLOOD PRESSURE: 85 MMHG

## 2021-05-06 DIAGNOSIS — Z3A.25 25 WEEKS GESTATION OF PREGNANCY: ICD-10-CM

## 2021-05-06 DIAGNOSIS — O09.93 HRP (HIGH RISK PREGNANCY), THIRD TRIMESTER: Primary | ICD-10-CM

## 2021-05-06 DIAGNOSIS — O28.0 ABNORMAL QUAD SCREEN: ICD-10-CM

## 2021-05-06 DIAGNOSIS — O13.2 GESTATIONAL HYPERTENSION, SECOND TRIMESTER: ICD-10-CM

## 2021-05-06 PROCEDURE — G8419 CALC BMI OUT NRM PARAM NOF/U: HCPCS | Performed by: SPECIALIST

## 2021-05-06 PROCEDURE — 99213 OFFICE O/P EST LOW 20 MIN: CPT | Performed by: SPECIALIST

## 2021-05-06 PROCEDURE — 1036F TOBACCO NON-USER: CPT | Performed by: SPECIALIST

## 2021-05-06 PROCEDURE — G8427 DOCREV CUR MEDS BY ELIG CLIN: HCPCS | Performed by: SPECIALIST

## 2021-05-06 RX ORDER — BUTALBITAL, ACETAMINOPHEN AND CAFFEINE 50; 325; 40 MG/1; MG/1; MG/1
1 TABLET ORAL EVERY 6 HOURS PRN
Qty: 60 TABLET | Refills: 0 | Status: ON HOLD | OUTPATIENT
Start: 2021-05-06 | End: 2021-07-27 | Stop reason: HOSPADM

## 2021-05-06 NOTE — PROGRESS NOTES
Anisa Malagon is a 23 y.o. female 25w3d        OB History    Para Term  AB Living   2       1     SAB TAB Ectopic Molar Multiple Live Births     1              # Outcome Date GA Lbr Valente/2nd Weight Sex Delivery Anes PTL Lv   2 Current            1 TAB                Chief Complaint   Patient presents with    High Risk Pregnancy        Blood pressure (!) 146/85, pulse 88, weight 201 lb (91.2 kg), last menstrual period 11/15/2020, not currently breastfeeding. The patient was seen and evaluated. There was positive fetal movements. No contractions or leakage of fluid. Signs and symptoms of  labor as well as labor were reviewed. The patient's anatomy ultrasound has been completed and reviewed with patient. The S/S of Pre-Eclampsia were reviewed with the patient in detail. She is to report any of these if they occur. She currently denies any of these. The patient is RH positive Rhogam Ordered: Not indicated. Patient Active Problem List    Diagnosis Date Noted    25 weeks gestation of pregnancy 2021    24 weeks gestation of pregnancy 2021    Hypertension during pregnancy in second trimester 2021    19 weeks gestation of pregnancy 2021    18 weeks gestation of pregnancy 2021    17 weeks gestation of pregnancy 03/10/2021    Abnormal quad screen 03/10/2021    HRP (high risk pregnancy), third trimester 2021    15 weeks gestation of pregnancy 2021        Diagnosis Orders   1. HRP (high risk pregnancy), third trimester     2. 25 weeks gestation of pregnancy     3. Abnormal quad screen     4. Gestational hypertension, second trimester       Patient with continued elevated blood pressure. Patient states that she has been taking blood pressure medication. If blood pressure remains elevated next week, increasing medication will be considered. Patient complains of round ligament pain. Reassurance provided.   Fetal heart rate auscultated at 160 bpm and fundal height is 27 cm. today. Patient advised to continue taking prenatal vitamins and to rest as necessary. Patient encouraged to keep follow up appointment with Dr. Wander Loza ([facility]). The patient will return to the office for her next visit in 1 week. See antepartum flow sheet. Ai Patel am scribing for, and in the presence of Dr. Kinjal Dowling. Electronically signed by: Daysi Bunch 5/6/21 3:22 PM   I agree to the above documentation placed by my scribe Daysi Bunch. I reviewed the scribe's note and agree with the documented findings and plan of care. Any areas of disagreement are noted on the chart. I have personally evaluated this patient. Additional findings are as noted. I agree with the chief complaint, past medical history, past surgical history, allergies, medications, social and family history as documented unless otherwise noted below.      Electronically signed by Kinjal Dowling MD on 5/7/2021 at 4:19 AM

## 2021-05-20 ENCOUNTER — HOSPITAL ENCOUNTER (OUTPATIENT)
Age: 26
Setting detail: SPECIMEN
Discharge: HOME | End: 2021-05-20
Payer: MEDICARE

## 2021-05-20 ENCOUNTER — ROUTINE PRENATAL (OUTPATIENT)
Dept: OBGYN CLINIC | Age: 26
End: 2021-05-20
Payer: MEDICARE

## 2021-05-20 VITALS
WEIGHT: 203 LBS | HEART RATE: 86 BPM | DIASTOLIC BLOOD PRESSURE: 75 MMHG | SYSTOLIC BLOOD PRESSURE: 128 MMHG | BODY MASS INDEX: 33.78 KG/M2

## 2021-05-20 DIAGNOSIS — O09.92 HRP (HIGH RISK PREGNANCY), SECOND TRIMESTER: Primary | ICD-10-CM

## 2021-05-20 DIAGNOSIS — O16.2 HYPERTENSION DURING PREGNANCY IN SECOND TRIMESTER, UNSPECIFIED HYPERTENSION IN PREGNANCY TYPE: ICD-10-CM

## 2021-05-20 DIAGNOSIS — Z3A.27 27 WEEKS GESTATION OF PREGNANCY: ICD-10-CM

## 2021-05-20 PROBLEM — Z3A.18 18 WEEKS GESTATION OF PREGNANCY: Status: RESOLVED | Noted: 2021-03-17 | Resolved: 2021-05-20

## 2021-05-20 PROBLEM — Z3A.24 24 WEEKS GESTATION OF PREGNANCY: Status: RESOLVED | Noted: 2021-04-29 | Resolved: 2021-05-20

## 2021-05-20 PROBLEM — Z3A.25 25 WEEKS GESTATION OF PREGNANCY: Status: RESOLVED | Noted: 2021-05-06 | Resolved: 2021-05-20

## 2021-05-20 PROBLEM — Z3A.19 19 WEEKS GESTATION OF PREGNANCY: Status: RESOLVED | Noted: 2021-03-25 | Resolved: 2021-05-20

## 2021-05-20 PROBLEM — Z3A.17 17 WEEKS GESTATION OF PREGNANCY: Status: RESOLVED | Noted: 2021-03-10 | Resolved: 2021-05-20

## 2021-05-20 PROBLEM — Z3A.15 15 WEEKS GESTATION OF PREGNANCY: Status: RESOLVED | Noted: 2021-02-28 | Resolved: 2021-05-20

## 2021-05-20 LAB
ABSOLUTE EOS #: 0.13 K/UL (ref 0–0.44)
ABSOLUTE IMMATURE GRANULOCYTE: 0.38 K/UL (ref 0–0.3)
ABSOLUTE LYMPH #: 1.84 K/UL (ref 1.1–3.7)
ABSOLUTE MONO #: 0.87 K/UL (ref 0.1–1.2)
BASOPHILS # BLD: 0 % (ref 0–2)
BASOPHILS ABSOLUTE: 0.04 K/UL (ref 0–0.2)
DIFFERENTIAL TYPE: ABNORMAL
EOSINOPHILS RELATIVE PERCENT: 1 % (ref 1–4)
GLUCOSE ADMINISTRATION: NORMAL
GLUCOSE TOLERANCE SCREEN 50G: 111 MG/DL (ref 70–135)
HCT VFR BLD CALC: 36.1 % (ref 36.3–47.1)
HEMOGLOBIN: 10.9 G/DL (ref 11.9–15.1)
IMMATURE GRANULOCYTES: 3 %
LYMPHOCYTES # BLD: 16 % (ref 24–43)
MCH RBC QN AUTO: 26.1 PG (ref 25.2–33.5)
MCHC RBC AUTO-ENTMCNC: 30.2 G/DL (ref 28.4–34.8)
MCV RBC AUTO: 86.4 FL (ref 82.6–102.9)
MONOCYTES # BLD: 8 % (ref 3–12)
NRBC AUTOMATED: 0 PER 100 WBC
PDW BLD-RTO: 13.9 % (ref 11.8–14.4)
PLATELET # BLD: 239 K/UL (ref 138–453)
PLATELET ESTIMATE: ABNORMAL
PMV BLD AUTO: 11.2 FL (ref 8.1–13.5)
RBC # BLD: 4.18 M/UL (ref 3.95–5.11)
RBC # BLD: ABNORMAL 10*6/UL
SEG NEUTROPHILS: 72 % (ref 36–65)
SEGMENTED NEUTROPHILS ABSOLUTE COUNT: 8.13 K/UL (ref 1.5–8.1)
WBC # BLD: 11.4 K/UL (ref 3.5–11.3)
WBC # BLD: ABNORMAL 10*3/UL

## 2021-05-20 PROCEDURE — 1036F TOBACCO NON-USER: CPT | Performed by: SPECIALIST

## 2021-05-20 PROCEDURE — G8427 DOCREV CUR MEDS BY ELIG CLIN: HCPCS | Performed by: SPECIALIST

## 2021-05-20 PROCEDURE — 90471 IMMUNIZATION ADMIN: CPT | Performed by: SPECIALIST

## 2021-05-20 PROCEDURE — 90715 TDAP VACCINE 7 YRS/> IM: CPT | Performed by: SPECIALIST

## 2021-05-20 PROCEDURE — 99213 OFFICE O/P EST LOW 20 MIN: CPT | Performed by: SPECIALIST

## 2021-05-20 PROCEDURE — G8419 CALC BMI OUT NRM PARAM NOF/U: HCPCS | Performed by: SPECIALIST

## 2021-05-20 RX ORDER — LABETALOL 100 MG/1
100 TABLET, FILM COATED ORAL 2 TIMES DAILY
Qty: 30 TABLET | Refills: 2 | Status: SHIPPED | OUTPATIENT
Start: 2021-05-20 | End: 2021-08-05

## 2021-05-27 ENCOUNTER — ROUTINE PRENATAL (OUTPATIENT)
Dept: OBGYN CLINIC | Age: 26
End: 2021-05-27
Payer: MEDICARE

## 2021-05-27 VITALS
WEIGHT: 204 LBS | SYSTOLIC BLOOD PRESSURE: 136 MMHG | HEART RATE: 86 BPM | BODY MASS INDEX: 33.95 KG/M2 | DIASTOLIC BLOOD PRESSURE: 76 MMHG

## 2021-05-27 DIAGNOSIS — Z3A.28 28 WEEKS GESTATION OF PREGNANCY: ICD-10-CM

## 2021-05-27 DIAGNOSIS — O09.93 HRP (HIGH RISK PREGNANCY), THIRD TRIMESTER: Primary | ICD-10-CM

## 2021-05-27 DIAGNOSIS — O16.2 HYPERTENSION DURING PREGNANCY IN SECOND TRIMESTER, UNSPECIFIED HYPERTENSION IN PREGNANCY TYPE: ICD-10-CM

## 2021-05-27 PROCEDURE — 99213 OFFICE O/P EST LOW 20 MIN: CPT | Performed by: SPECIALIST

## 2021-05-27 PROCEDURE — G8419 CALC BMI OUT NRM PARAM NOF/U: HCPCS | Performed by: SPECIALIST

## 2021-05-27 PROCEDURE — 1036F TOBACCO NON-USER: CPT | Performed by: SPECIALIST

## 2021-05-27 PROCEDURE — G8427 DOCREV CUR MEDS BY ELIG CLIN: HCPCS | Performed by: SPECIALIST

## 2021-05-27 NOTE — PROGRESS NOTES
Jeff Jeff is a 27 y.o. female 29w2d        OB History    Para Term  AB Living   2       1     SAB TAB Ectopic Molar Multiple Live Births     1              # Outcome Date GA Lbr Valente/2nd Weight Sex Delivery Anes PTL Lv   2 Current            1 TAB                Blood pressure 136/76, pulse 86, weight 204 lb (92.5 kg), last menstrual period 11/15/2020, not currently breastfeeding. The patient was seen and evaluated. There was positive fetal movements. No contractions or leakage of fluid. Signs and symptoms of  labor as well as labor were reviewed. The S/S of Pre-Eclampsia were reviewed with the patient in detail. She is to report any of these if they occur. She currently denies any of these. The patient had her 28 week labs ordered. The patient was instructed on fetal kick counts and was given a kick sheet to complete every 8 hours. She was instructed that the baby should move at a minimum of ten times within one hour after a meal. The patient was instructed to lay down on her left side twenty minutes after eating and count movements for up to one hour with a target value of ten movements. She was instructed to notify the office if she did not make that target after two attempts or if after any attempt there was less than four movements. The patient reports that the targets have been made Yes. Patient Active Problem List    Diagnosis Date Noted    27 weeks gestation of pregnancy 2021    Hypertension during pregnancy in second trimester 2021    Abnormal quad screen 03/10/2021    HRP (high risk pregnancy), second trimester 2021        Diagnosis Orders   1. HRP (high risk pregnancy), third trimester     2. 28 weeks gestation of pregnancy     3. Hypertension during pregnancy in second trimester, unspecified hypertension in pregnancy type         The patient will return to the office for her next visit in 1 weeks. See antepartum flow sheet.

## 2021-06-10 ENCOUNTER — ROUTINE PRENATAL (OUTPATIENT)
Dept: OBGYN CLINIC | Age: 26
End: 2021-06-10
Payer: MEDICARE

## 2021-06-10 VITALS
DIASTOLIC BLOOD PRESSURE: 78 MMHG | SYSTOLIC BLOOD PRESSURE: 130 MMHG | HEART RATE: 101 BPM | BODY MASS INDEX: 34.11 KG/M2 | WEIGHT: 205 LBS

## 2021-06-10 DIAGNOSIS — O09.93 HIGH-RISK PREGNANCY IN THIRD TRIMESTER: Primary | ICD-10-CM

## 2021-06-10 DIAGNOSIS — Z3A.30 30 WEEKS GESTATION OF PREGNANCY: ICD-10-CM

## 2021-06-10 PROCEDURE — G8419 CALC BMI OUT NRM PARAM NOF/U: HCPCS | Performed by: SPECIALIST

## 2021-06-10 PROCEDURE — 99213 OFFICE O/P EST LOW 20 MIN: CPT | Performed by: SPECIALIST

## 2021-06-10 PROCEDURE — 1036F TOBACCO NON-USER: CPT | Performed by: SPECIALIST

## 2021-06-10 PROCEDURE — G8427 DOCREV CUR MEDS BY ELIG CLIN: HCPCS | Performed by: SPECIALIST

## 2021-06-10 RX ORDER — VITAMIN A ACETATE, .BETA.-CAROTENE, ASCORBIC ACID, CHOLECALCIFEROL, .ALPHA.-TOCOPHEROL ACETATE, DL-, THIAMINE MONONITRATE, RIBOFLAVIN, NIACINAMIDE, PYRIDOXINE HYDROCHLORIDE, FOLIC ACID, CYANOCOBALAMIN, CALCIUM CARBONATE, FERROUS FUMARATE, ZINC OXIDE, AND CUPRIC OXIDE 2000; 2000; 120; 400; 22; 1.84; 3; 20; 10; 1; 12; 200; 27; 25; 2 [IU]/1; [IU]/1; MG/1; [IU]/1; MG/1; MG/1; MG/1; MG/1; MG/1; MG/1; UG/1; MG/1; MG/1; MG/1; MG/1
1 TABLET ORAL DAILY
Qty: 30 TABLET | Refills: 5 | Status: SHIPPED | OUTPATIENT
Start: 2021-06-10 | End: 2021-06-14 | Stop reason: SDUPTHER

## 2021-06-10 SDOH — ECONOMIC STABILITY: TRANSPORTATION INSECURITY
IN THE PAST 12 MONTHS, HAS LACK OF TRANSPORTATION KEPT YOU FROM MEETINGS, WORK, OR FROM GETTING THINGS NEEDED FOR DAILY LIVING?: NO

## 2021-06-10 SDOH — ECONOMIC STABILITY: FOOD INSECURITY: WITHIN THE PAST 12 MONTHS, THE FOOD YOU BOUGHT JUST DIDN'T LAST AND YOU DIDN'T HAVE MONEY TO GET MORE.: NEVER TRUE

## 2021-06-10 SDOH — ECONOMIC STABILITY: TRANSPORTATION INSECURITY
IN THE PAST 12 MONTHS, HAS THE LACK OF TRANSPORTATION KEPT YOU FROM MEDICAL APPOINTMENTS OR FROM GETTING MEDICATIONS?: NO

## 2021-06-10 SDOH — ECONOMIC STABILITY: FOOD INSECURITY: WITHIN THE PAST 12 MONTHS, YOU WORRIED THAT YOUR FOOD WOULD RUN OUT BEFORE YOU GOT MONEY TO BUY MORE.: NEVER TRUE

## 2021-06-10 ASSESSMENT — PATIENT HEALTH QUESTIONNAIRE - PHQ9
2. FEELING DOWN, DEPRESSED OR HOPELESS: 0
SUM OF ALL RESPONSES TO PHQ QUESTIONS 1-9: 0
SUM OF ALL RESPONSES TO PHQ9 QUESTIONS 1 & 2: 0
1. LITTLE INTEREST OR PLEASURE IN DOING THINGS: 0

## 2021-06-10 ASSESSMENT — SOCIAL DETERMINANTS OF HEALTH (SDOH): HOW HARD IS IT FOR YOU TO PAY FOR THE VERY BASICS LIKE FOOD, HOUSING, MEDICAL CARE, AND HEATING?: NOT HARD AT ALL

## 2021-06-10 NOTE — PROGRESS NOTES
Judy Orta is a 27 y.o. female 30w3d        OB History    Para Term  AB Living   2       1     SAB TAB Ectopic Molar Multiple Live Births     1              # Outcome Date GA Lbr Valente/2nd Weight Sex Delivery Anes PTL Lv   2 Current            1 TAB                Blood pressure 130/78, pulse 101, weight 205 lb (93 kg), last menstrual period 11/15/2020, not currently breastfeeding. The patient was seen and evaluated. There was positive fetal movements. No contractions or leakage of fluid. Signs and symptoms of  labor as well as labor were reviewed. The S/S of Pre-Eclampsia were reviewed with the patient in detail. She is to report any of these if they occur. She currently denies any of these. The patient had her 28 week labs completed. The patient was instructed on fetal kick counts and was given a kick sheet to complete every 8 hours. She was instructed that the baby should move at a minimum of ten times within one hour after a meal. The patient was instructed to lay down on her left side twenty minutes after eating and count movements for up to one hour with a target value of ten movements. She was instructed to notify the office if she did not make that target after two attempts or if after any attempt there was less than four movements. The patient reports that the targets have been made Yes. Patient Active Problem List    Diagnosis Date Noted    27 weeks gestation of pregnancy 2021    Hypertension during pregnancy in second trimester 2021    Abnormal quad screen 03/10/2021    HRP (high risk pregnancy), second trimester 2021        Diagnosis Orders   1. High-risk pregnancy in third trimester     2. 30 weeks gestation of pregnancy         The patient will return to the office for her next visit in 1 weeks. See antepartum flow sheet.

## 2021-06-14 RX ORDER — VITAMIN A ACETATE, .BETA.-CAROTENE, ASCORBIC ACID, CHOLECALCIFEROL, .ALPHA.-TOCOPHEROL ACETATE, DL-, THIAMINE MONONITRATE, RIBOFLAVIN, NIACINAMIDE, PYRIDOXINE HYDROCHLORIDE, FOLIC ACID, CYANOCOBALAMIN, CALCIUM CARBONATE, FERROUS FUMARATE, ZINC OXIDE, AND CUPRIC OXIDE 2000; 2000; 120; 400; 22; 1.84; 3; 20; 10; 1; 12; 200; 27; 25; 2 [IU]/1; [IU]/1; MG/1; [IU]/1; MG/1; MG/1; MG/1; MG/1; MG/1; MG/1; UG/1; MG/1; MG/1; MG/1; MG/1
1 TABLET ORAL DAILY
Qty: 30 TABLET | Refills: 11 | Status: SHIPPED | OUTPATIENT
Start: 2021-06-14 | End: 2021-08-05

## 2021-06-21 ENCOUNTER — ROUTINE PRENATAL (OUTPATIENT)
Dept: OBGYN CLINIC | Age: 26
End: 2021-06-21
Payer: MEDICARE

## 2021-06-21 ENCOUNTER — HOSPITAL ENCOUNTER (OUTPATIENT)
Age: 26
Setting detail: SPECIMEN
Discharge: HOME | End: 2021-06-21
Payer: MEDICARE

## 2021-06-21 VITALS
DIASTOLIC BLOOD PRESSURE: 78 MMHG | WEIGHT: 205 LBS | SYSTOLIC BLOOD PRESSURE: 122 MMHG | BODY MASS INDEX: 34.11 KG/M2 | HEART RATE: 92 BPM

## 2021-06-21 DIAGNOSIS — Z3A.32 32 WEEKS GESTATION OF PREGNANCY: ICD-10-CM

## 2021-06-21 DIAGNOSIS — O09.93 ENCOUNTER FOR SUPERVISION OF HIGH RISK PREGNANCY IN THIRD TRIMESTER, ANTEPARTUM: Primary | ICD-10-CM

## 2021-06-21 DIAGNOSIS — E03.9 HYPOTHYROID IN PREGNANCY, ANTEPARTUM, THIRD TRIMESTER: ICD-10-CM

## 2021-06-21 DIAGNOSIS — O16.3 HYPERTENSION AFFECTING PREGNANCY IN THIRD TRIMESTER: ICD-10-CM

## 2021-06-21 DIAGNOSIS — O28.0 ABNORMAL QUAD SCREEN: ICD-10-CM

## 2021-06-21 DIAGNOSIS — O99.283 HYPOTHYROID IN PREGNANCY, ANTEPARTUM, THIRD TRIMESTER: ICD-10-CM

## 2021-06-21 PROBLEM — Z3A.27 27 WEEKS GESTATION OF PREGNANCY: Status: RESOLVED | Noted: 2021-05-20 | Resolved: 2021-06-21

## 2021-06-21 PROBLEM — Z3A.30 30 WEEKS GESTATION OF PREGNANCY: Status: RESOLVED | Noted: 2021-06-10 | Resolved: 2021-06-21

## 2021-06-21 PROBLEM — O16.2 HYPERTENSION DURING PREGNANCY IN SECOND TRIMESTER: Status: RESOLVED | Noted: 2021-04-29 | Resolved: 2021-06-21

## 2021-06-21 LAB
ACCELERATIONS > 10BPM: NORMAL
ACCELERATIONS > 10BPM: NORMAL
ACCELERATIONS > 15 BPM: 2
ACCELERATIONS > 15 BPM: 5
ACOUSTIC STIMULATION: NO
ACOUSTIC STIMULATION: NORMAL
DECELERATIONS: NORMAL
DECELERATIONS: NORMAL
FHR VARIABILITIES: NORMAL
FHR VARIABILITIES: NORMAL
NST ASSESSMENT: REACTIVE
NST ASSESSMENT: REACTIVE
NST BASELINE: 155
NST BASELINE: 155
NST DURATION: 20 MINUTES
NST DURATION: 30 MINUTES
NST INDICATIONS: NORMAL
NST INDICATIONS: NORMAL
NST LOCATIONS: NORMAL
NST LOCATIONS: NORMAL
NST READ BY: NORMAL
NST READ BY: NORMAL
NST RETURN: NORMAL
NST RETURN: NORMAL
TSH SERPL DL<=0.05 MIU/L-ACNC: 0.17 MIU/L (ref 0.3–5)
TSH SERPL DL<=0.05 MIU/L-ACNC: NORMAL M[IU]/L
UTERINE ACTIVITY: NO
UTERINE ACTIVITY: NORMAL

## 2021-06-21 PROCEDURE — 36415 COLL VENOUS BLD VENIPUNCTURE: CPT | Performed by: CLINICAL NURSE SPECIALIST

## 2021-06-21 PROCEDURE — G8427 DOCREV CUR MEDS BY ELIG CLIN: HCPCS | Performed by: CLINICAL NURSE SPECIALIST

## 2021-06-21 PROCEDURE — 99213 OFFICE O/P EST LOW 20 MIN: CPT | Performed by: CLINICAL NURSE SPECIALIST

## 2021-06-21 PROCEDURE — 59025 FETAL NON-STRESS TEST: CPT | Performed by: CLINICAL NURSE SPECIALIST

## 2021-06-21 PROCEDURE — 1036F TOBACCO NON-USER: CPT | Performed by: CLINICAL NURSE SPECIALIST

## 2021-06-21 PROCEDURE — G8419 CALC BMI OUT NRM PARAM NOF/U: HCPCS | Performed by: CLINICAL NURSE SPECIALIST

## 2021-06-21 RX ORDER — ASPIRIN 81 MG/1
81 TABLET ORAL DAILY
Qty: 30 TABLET | Refills: 5 | Status: ON HOLD | OUTPATIENT
Start: 2021-06-21 | End: 2021-07-27 | Stop reason: HOSPADM

## 2021-06-21 NOTE — PROGRESS NOTES
Patient was in the office today for a TSH. Draw per physician order using sterile technique.   Drawn from the right antecubital.

## 2021-06-21 NOTE — PROGRESS NOTES
Olivia Dumont is a 27 y.o. female 32w0d        OB History    Para Term  AB Living   2       1     SAB TAB Ectopic Molar Multiple Live Births     1              # Outcome Date GA Lbr Valente/2nd Weight Sex Delivery Anes PTL Lv   2 Current            1 TAB                Blood pressure 122/78, pulse 92, weight 205 lb (93 kg), last menstrual period 11/15/2020, not currently breastfeeding. The patient was seen and evaluated. There was positive fetal movements. No contractions or leakage of fluid. Signs and symptoms of  labor as well as labor were reviewed. The S/S of Pre-Eclampsia were reviewed with the patient in detail. She is to report any of these if they occur. She currently denies any of these. The patient had her 28 week labs completed. The patient was instructed on fetal kick counts and was given a kick sheet to complete every 8 hours. She was instructed that the baby should move at a minimum of ten times within one hour after a meal. The patient was instructed to lay down on her left side twenty minutes after eating and count movements for up to one hour with a target value of ten movements. She was instructed to notify the office if she did not make that target after two attempts or if after any attempt there was less than four movements. The patient reports that the targets have been made Yes. Patient Active Problem List    Diagnosis Date Noted    Hypertension affecting pregnancy in third trimester 2021    Hypothyroid in pregnancy, antepartum, third trimester 2021    Abnormal quad screen 03/10/2021    High-risk pregnancy in third trimester 2021        Diagnosis Orders   1. Encounter for supervision of high risk pregnancy in third trimester, antepartum  Protein / Creatinine Ratio, Urine    aspirin EC 81 MG EC tablet   2.  Hypertension affecting pregnancy in third trimester  Protein / Creatinine Ratio, Urine    aspirin EC 81 MG EC tablet 3. Hypothyroid in pregnancy, antepartum, third trimester  TSH   4. 32 weeks gestation of pregnancy     5. Abnormal quad screen     Continue prenatal vitamins, increase water intake and frequent rest periods as needed. Fetal kick counts reviewed. Discussed with patient using aspirin and the need for some blood work today  Per [facility] request repeat TSH today and protein/creatine ratio      The patient will return to the office for her next visit in 1 weeks. See antepartum flow sheet.      Electronically signed by: Mainor Chacon CNP

## 2021-06-21 NOTE — PATIENT INSTRUCTIONS
Patient Education        Counting Your Baby's Kicks: Care Instructions  Your Care Instructions     Counting your baby's kicks is one way your doctor can tell that your baby is healthy. Most women--especially in a first pregnancy--feel their baby move for the first time between 16 and 22 weeks. The movement may feel like flutters rather than kicks. Your baby may move more at certain times of the day. When you are active, you may notice less kicking than when you are resting. At your prenatal visits, your doctor will ask whether the baby is active. In your last trimester, your doctor may ask you to count the number of times you feel your baby move. Follow-up care is a key part of your treatment and safety. Be sure to make and go to all appointments, and call your doctor if you are having problems. It's also a good idea to know your test results and keep a list of the medicines you take. How do you count fetal kicks? · A common method of checking your baby's movement is to count the number of kicks or moves you feel in 1 hour. Ten movements (such as kicks, flutters, or rolls) in 1 hour are normal. Some doctors suggest that you count in the morning until you get to 10 movements. Then you can quit for that day and start again the next day. · Pick your baby's most active time of day to count. This may be any time from morning to evening. · If you do not feel 10 movements in an hour, your baby may be sleeping. Wait for the next hour and count again. When should you call for help? Call your doctor now or seek immediate medical care if:    · You noticed that your baby has stopped moving or is moving much less than normal.   Watch closely for changes in your health, and be sure to contact your doctor if you have any problems. Where can you learn more? Go to [email or web address].[email or web address] org and sign in to your [employer] account.  Enter M308 in the [employer] box to learn more about \"Counting Your Baby's Kicks: Care Instructions. \"     If you do not have an account, please click on the \"Sign Up Now\" link. Current as of: October 8, 2020               Content Version: 12.9  © 2006-2021 Healthwise, Incorporated. Care instructions adapted under license by [facility] ([facility]). If you have questions about a medical condition or this instruction, always ask your healthcare professional. [address] any warranty or liability for your use of this information.

## 2021-06-28 ENCOUNTER — ROUTINE PRENATAL (OUTPATIENT)
Dept: OBGYN CLINIC | Age: 26
End: 2021-06-28
Payer: MEDICARE

## 2021-06-28 ENCOUNTER — HOSPITAL ENCOUNTER (OUTPATIENT)
Age: 26
Setting detail: SPECIMEN
Discharge: HOME | End: 2021-06-28
Payer: MEDICARE

## 2021-06-28 VITALS
WEIGHT: 211.2 LBS | BODY MASS INDEX: 35.15 KG/M2 | DIASTOLIC BLOOD PRESSURE: 76 MMHG | HEART RATE: 102 BPM | SYSTOLIC BLOOD PRESSURE: 135 MMHG

## 2021-06-28 DIAGNOSIS — O16.3 HYPERTENSION AFFECTING PREGNANCY IN THIRD TRIMESTER: ICD-10-CM

## 2021-06-28 DIAGNOSIS — O09.93 ENCOUNTER FOR SUPERVISION OF HIGH RISK PREGNANCY IN THIRD TRIMESTER, ANTEPARTUM: ICD-10-CM

## 2021-06-28 DIAGNOSIS — O09.93 HIGH-RISK PREGNANCY IN THIRD TRIMESTER: Primary | ICD-10-CM

## 2021-06-28 DIAGNOSIS — O99.283 HYPOTHYROID IN PREGNANCY, ANTEPARTUM, THIRD TRIMESTER: ICD-10-CM

## 2021-06-28 DIAGNOSIS — Z3A.33 33 WEEKS GESTATION OF PREGNANCY: ICD-10-CM

## 2021-06-28 DIAGNOSIS — O09.93 HIGH-RISK PREGNANCY IN THIRD TRIMESTER: ICD-10-CM

## 2021-06-28 DIAGNOSIS — E03.9 HYPOTHYROID IN PREGNANCY, ANTEPARTUM, THIRD TRIMESTER: ICD-10-CM

## 2021-06-28 LAB
ACCELERATIONS > 10BPM: NORMAL
ACCELERATIONS > 15 BPM: 5
ACOUSTIC STIMULATION: NO
DECELERATIONS: NORMAL
FHR VARIABILITIES: NORMAL
NST ASSESSMENT: REACTIVE
NST BASELINE: 150
NST DURATION: 20 MINUTES
NST INDICATIONS: NORMAL
NST LOCATIONS: NORMAL
NST READ BY: NORMAL
NST RETURN: NORMAL
UTERINE ACTIVITY: NO

## 2021-06-28 PROCEDURE — 1036F TOBACCO NON-USER: CPT | Performed by: CLINICAL NURSE SPECIALIST

## 2021-06-28 PROCEDURE — G8427 DOCREV CUR MEDS BY ELIG CLIN: HCPCS | Performed by: CLINICAL NURSE SPECIALIST

## 2021-06-28 PROCEDURE — 59025 FETAL NON-STRESS TEST: CPT | Performed by: CLINICAL NURSE SPECIALIST

## 2021-06-28 PROCEDURE — 99213 OFFICE O/P EST LOW 20 MIN: CPT | Performed by: CLINICAL NURSE SPECIALIST

## 2021-06-28 PROCEDURE — G8419 CALC BMI OUT NRM PARAM NOF/U: HCPCS | Performed by: CLINICAL NURSE SPECIALIST

## 2021-06-28 NOTE — PROGRESS NOTES
Lynn Beltran is a 27 y.o. female 33w0d        OB History    Para Term  AB Living   2       1     SAB TAB Ectopic Molar Multiple Live Births     1              # Outcome Date GA Lbr Valente/2nd Weight Sex Delivery Anes PTL Lv   2 Current            1 TAB                Blood pressure 135/76, pulse 102, weight 211 lb 3.2 oz (95.8 kg), last menstrual period 11/15/2020, not currently breastfeeding. The patient was seen and evaluated. There was positive fetal movements. No contractions or leakage of fluid. Signs and symptoms of  labor as well as labor were reviewed. The S/S of Pre-Eclampsia were reviewed with the patient in detail. She is to report any of these if they occur. She currently denies any of these. The patient had her 28 week labs completed. The patient was instructed on fetal kick counts and was given a kick sheet to complete every 8 hours. She was instructed that the baby should move at a minimum of ten times within one hour after a meal. The patient was instructed to lay down on her left side twenty minutes after eating and count movements for up to one hour with a target value of ten movements. She was instructed to notify the office if she did not make that target after two attempts or if after any attempt there was less than four movements. The patient reports that the targets have been made Yes. Patient Active Problem List    Diagnosis Date Noted    Hypertension affecting pregnancy in third trimester 2021    Hypothyroid in pregnancy, antepartum, third trimester 2021    Abnormal quad screen 03/10/2021    High-risk pregnancy in third trimester 2021        Diagnosis Orders   1. High-risk pregnancy in third trimester     2. Hypertension affecting pregnancy in third trimester     3.  Hypothyroid in pregnancy, antepartum, third trimester     4. 33 weeks gestation of pregnancy     Continue prenatal vitamins, increase water intake and frequent rest periods as needed. Fetal kick counts reviewed. The patient will return to the office for her next visit in 1 weeks. See antepartum flow sheet.      Electronically signed by: Gato Chavez CNP

## 2021-06-29 LAB
CREATININE URINE: 136.6 MG/DL (ref 28–217)
TOTAL PROTEIN, URINE: 22 MG/DL
URINE TOTAL PROTEIN CREATININE RATIO: 0.16 (ref 0–0.2)

## 2021-07-14 ENCOUNTER — ROUTINE PRENATAL (OUTPATIENT)
Dept: OBGYN CLINIC | Age: 26
End: 2021-07-14
Payer: MEDICARE

## 2021-07-14 VITALS
DIASTOLIC BLOOD PRESSURE: 80 MMHG | HEART RATE: 108 BPM | SYSTOLIC BLOOD PRESSURE: 133 MMHG | WEIGHT: 210 LBS | BODY MASS INDEX: 34.95 KG/M2

## 2021-07-14 DIAGNOSIS — Z34.93 PRENATAL CARE IN THIRD TRIMESTER: Primary | ICD-10-CM

## 2021-07-14 PROCEDURE — 99213 OFFICE O/P EST LOW 20 MIN: CPT | Performed by: OBSTETRICS & GYNECOLOGY

## 2021-07-15 NOTE — PROGRESS NOTES
Vanessa Beltran is a 27 y.o. female 35w2d        OB History    Para Term  AB Living   2       1     SAB TAB Ectopic Molar Multiple Live Births     1              # Outcome Date GA Lbr Valente/2nd Weight Sex Delivery Anes PTL Lv   2 Current            1 TAB                  Vitals  BP: 133/80  Weight: 210 lb (95.3 kg)  Pulse: 108  Patient Position: Sitting  Albumin: Negative  Glucose: Negative  Fetal Heart Rate: 145  Movement: Present      BPP today:   ROM: 68/83  Cephalic    Use of  to discuss IOL at 37-38 weeks due to chronic HTN - IOL is to be week of -, patient will have GBS next week and let office know what day works for her and her . All questions answered. 21 TDAP given  Chronic HTN - deliver at 37-38 weeks  [place]ic speaking, requires   Abnormal quad screen  Rh POSITIVE      Allergies: Allergies as of 2021    (No Known Allergies)     GBS Results:   No visits with results within 1 Week(s) from this visit. Latest known visit with results is:   Hospital Outpatient Visit on 2021   Component Date Value Ref Range Status    Total Protein, Urine 2021 22  mg/dL Final    No normal range established.  Creatinine, Ur 2021 136.6  28.0 - 217.0 mg/dL Final    Urine Total Protein Creatinine Rat* 2021 0.16  0.00 - 0.20 Final       Assessment:  1. Vanessa Beltran is a 27 y.o. female  2.   3. 35w3d      Patient Active Problem List    Diagnosis Date Noted    Hypertension affecting pregnancy in third trimester 2021    Hypothyroid in pregnancy, antepartum, third trimester 2021    Abnormal quad screen 03/10/2021    High-risk pregnancy in third trimester 2021           Plan:  The patient will return to the office for her next visit in 1 weeks. See antepartum flow sheet.    GBS next week

## 2021-07-21 ENCOUNTER — ROUTINE PRENATAL (OUTPATIENT)
Dept: OBGYN CLINIC | Age: 26
End: 2021-07-21
Payer: MEDICARE

## 2021-07-21 ENCOUNTER — HOSPITAL ENCOUNTER (OUTPATIENT)
Age: 26
Setting detail: SPECIMEN
Discharge: HOME | End: 2021-07-21
Payer: MEDICARE

## 2021-07-21 VITALS
DIASTOLIC BLOOD PRESSURE: 82 MMHG | BODY MASS INDEX: 35.28 KG/M2 | WEIGHT: 212 LBS | SYSTOLIC BLOOD PRESSURE: 130 MMHG | HEART RATE: 112 BPM

## 2021-07-21 DIAGNOSIS — Z34.93 PRENATAL CARE IN THIRD TRIMESTER: ICD-10-CM

## 2021-07-21 DIAGNOSIS — Z34.93 PRENATAL CARE IN THIRD TRIMESTER: Primary | ICD-10-CM

## 2021-07-21 PROCEDURE — 99213 OFFICE O/P EST LOW 20 MIN: CPT | Performed by: OBSTETRICS & GYNECOLOGY

## 2021-07-23 LAB
DIRECT EXAM: NORMAL
Lab: NORMAL
SPECIMEN DESCRIPTION: NORMAL

## 2021-07-25 NOTE — PROGRESS NOTES
postpartum. See antepartum flow sheet. Induction set for [facility] for 7/26/2021 at 1400 pm, patient aware.

## 2021-07-26 ENCOUNTER — HOSPITAL ENCOUNTER (INPATIENT)
Age: 26
LOS: 5 days | Discharge: HOME | DRG: 540 | End: 2021-07-31
Attending: OBSTETRICS & GYNECOLOGY | Admitting: OBSTETRICS & GYNECOLOGY
Payer: MEDICARE

## 2021-07-26 ENCOUNTER — APPOINTMENT (OUTPATIENT)
Dept: LABOR AND DELIVERY | Age: 26
DRG: 540 | End: 2021-07-26
Payer: MEDICARE

## 2021-07-26 DIAGNOSIS — Z98.890 POST-OPERATIVE STATE: Primary | ICD-10-CM

## 2021-07-26 PROBLEM — Z3A.37 37 WEEKS GESTATION OF PREGNANCY: Status: ACTIVE | Noted: 2021-07-26

## 2021-07-26 LAB
ABO/RH: NORMAL
ABSOLUTE EOS #: 0.1 K/UL (ref 0–0.44)
ABSOLUTE IMMATURE GRANULOCYTE: 0.14 K/UL (ref 0–0.3)
ABSOLUTE LYMPH #: 1.74 K/UL (ref 1.1–3.7)
ABSOLUTE MONO #: 0.77 K/UL (ref 0.1–1.2)
ALBUMIN SERPL-MCNC: 3.8 G/DL (ref 3.5–5.2)
ALBUMIN/GLOBULIN RATIO: ABNORMAL (ref 1–2.5)
ALP BLD-CCNC: 151 U/L (ref 35–104)
ALT SERPL-CCNC: 16 U/L (ref 5–33)
ANION GAP SERPL CALCULATED.3IONS-SCNC: 13 MMOL/L (ref 9–17)
ANTIBODY SCREEN: NEGATIVE
ARM BAND NUMBER: NORMAL
AST SERPL-CCNC: 19 U/L
BASOPHILS # BLD: 1 % (ref 0–2)
BASOPHILS ABSOLUTE: 0.04 K/UL (ref 0–0.2)
BILIRUB SERPL-MCNC: 0.16 MG/DL (ref 0.3–1.2)
BUN BLDV-MCNC: 7 MG/DL (ref 6–20)
BUN/CREAT BLD: ABNORMAL (ref 9–20)
CALCIUM SERPL-MCNC: 9.6 MG/DL (ref 8.6–10.4)
CHLORIDE BLD-SCNC: 104 MMOL/L (ref 98–107)
CO2: 22 MMOL/L (ref 20–31)
CREAT SERPL-MCNC: <0.4 MG/DL (ref 0.5–0.9)
DIFFERENTIAL TYPE: ABNORMAL
EOSINOPHILS RELATIVE PERCENT: 1 % (ref 1–4)
EXPIRATION DATE: NORMAL
GFR AFRICAN AMERICAN: ABNORMAL ML/MIN
GFR NON-AFRICAN AMERICAN: ABNORMAL ML/MIN
GFR SERPL CREATININE-BSD FRML MDRD: ABNORMAL ML/MIN/{1.73_M2}
GFR SERPL CREATININE-BSD FRML MDRD: ABNORMAL ML/MIN/{1.73_M2}
GLUCOSE BLD-MCNC: 111 MG/DL (ref 70–99)
HCT VFR BLD CALC: 38.3 % (ref 36.3–47.1)
HEMOGLOBIN: 11.7 G/DL (ref 11.9–15.1)
IMMATURE GRANULOCYTES: 2 %
LYMPHOCYTES # BLD: 20 % (ref 24–43)
MCH RBC QN AUTO: 25.7 PG (ref 25.2–33.5)
MCHC RBC AUTO-ENTMCNC: 30.5 G/DL (ref 28.4–34.8)
MCV RBC AUTO: 84.2 FL (ref 82.6–102.9)
MONOCYTES # BLD: 9 % (ref 3–12)
NRBC AUTOMATED: 0 PER 100 WBC
PDW BLD-RTO: 14.2 % (ref 11.8–14.4)
PLATELET # BLD: 192 K/UL (ref 138–453)
PLATELET ESTIMATE: ABNORMAL
PMV BLD AUTO: 11.4 FL (ref 8.1–13.5)
POTASSIUM SERPL-SCNC: 3.9 MMOL/L (ref 3.7–5.3)
RBC # BLD: 4.55 M/UL (ref 3.95–5.11)
RBC # BLD: ABNORMAL 10*6/UL
SARS-COV-2, RAPID: NOT DETECTED
SEG NEUTROPHILS: 67 % (ref 36–65)
SEGMENTED NEUTROPHILS ABSOLUTE COUNT: 5.87 K/UL (ref 1.5–8.1)
SODIUM BLD-SCNC: 139 MMOL/L (ref 135–144)
SPECIMEN DESCRIPTION: NORMAL
T. PALLIDUM, IGG: NONREACTIVE
THYROXINE, FREE: 1.07 NG/DL (ref 0.93–1.7)
TOTAL PROTEIN: 6.8 G/DL (ref 6.4–8.3)
TSH SERPL DL<=0.05 MIU/L-ACNC: 0.09 MIU/L (ref 0.3–5)
WBC # BLD: 8.7 K/UL (ref 3.5–11.3)
WBC # BLD: ABNORMAL 10*3/UL

## 2021-07-26 PROCEDURE — 84156 ASSAY OF PROTEIN URINE: CPT

## 2021-07-26 PROCEDURE — 1220000000 HC SEMI PRIVATE OB R&B

## 2021-07-26 PROCEDURE — 86850 RBC ANTIBODY SCREEN: CPT

## 2021-07-26 PROCEDURE — 84443 ASSAY THYROID STIM HORMONE: CPT

## 2021-07-26 PROCEDURE — 6360000002 HC RX W HCPCS: Performed by: STUDENT IN AN ORGANIZED HEALTH CARE EDUCATION/TRAINING PROGRAM

## 2021-07-26 PROCEDURE — 84439 ASSAY OF FREE THYROXINE: CPT

## 2021-07-26 PROCEDURE — 82570 ASSAY OF URINE CREATININE: CPT

## 2021-07-26 PROCEDURE — 6370000000 HC RX 637 (ALT 250 FOR IP): Performed by: STUDENT IN AN ORGANIZED HEALTH CARE EDUCATION/TRAINING PROGRAM

## 2021-07-26 PROCEDURE — 59200 INSERT CERVICAL DILATOR: CPT

## 2021-07-26 PROCEDURE — 87635 SARS-COV-2 COVID-19 AMP PRB: CPT

## 2021-07-26 PROCEDURE — 80307 DRUG TEST PRSMV CHEM ANLYZR: CPT

## 2021-07-26 PROCEDURE — 86780 TREPONEMA PALLIDUM: CPT

## 2021-07-26 PROCEDURE — 2580000003 HC RX 258: Performed by: STUDENT IN AN ORGANIZED HEALTH CARE EDUCATION/TRAINING PROGRAM

## 2021-07-26 PROCEDURE — 96372 THER/PROPH/DIAG INJ SC/IM: CPT

## 2021-07-26 PROCEDURE — 85025 COMPLETE CBC W/AUTO DIFF WBC: CPT

## 2021-07-26 PROCEDURE — 86901 BLOOD TYPING SEROLOGIC RH(D): CPT

## 2021-07-26 PROCEDURE — 86900 BLOOD TYPING SEROLOGIC ABO: CPT

## 2021-07-26 PROCEDURE — 80053 COMPREHEN METABOLIC PANEL: CPT

## 2021-07-26 RX ORDER — LABETALOL 100 MG/1
100 TABLET, FILM COATED ORAL EVERY 12 HOURS SCHEDULED
Status: DISCONTINUED | OUTPATIENT
Start: 2021-07-26 | End: 2021-07-31 | Stop reason: HOSPADM

## 2021-07-26 RX ORDER — LABETALOL 100 MG/1
100 TABLET, FILM COATED ORAL EVERY 12 HOURS SCHEDULED
Status: DISCONTINUED | OUTPATIENT
Start: 2021-07-26 | End: 2021-07-26

## 2021-07-26 RX ORDER — PROMETHAZINE HYDROCHLORIDE 25 MG/ML
25 INJECTION, SOLUTION INTRAMUSCULAR; INTRAVENOUS ONCE
Status: COMPLETED | OUTPATIENT
Start: 2021-07-26 | End: 2021-07-26

## 2021-07-26 RX ORDER — ACETAMINOPHEN 500 MG
1000 TABLET ORAL EVERY 6 HOURS PRN
Status: DISCONTINUED | OUTPATIENT
Start: 2021-07-26 | End: 2021-07-27

## 2021-07-26 RX ORDER — SODIUM CHLORIDE 0.9 % (FLUSH) 0.9 %
5-40 SYRINGE (ML) INJECTION PRN
Status: DISCONTINUED | OUTPATIENT
Start: 2021-07-26 | End: 2021-07-27

## 2021-07-26 RX ORDER — ONDANSETRON 2 MG/ML
4 INJECTION INTRAMUSCULAR; INTRAVENOUS EVERY 6 HOURS PRN
Status: DISCONTINUED | OUTPATIENT
Start: 2021-07-26 | End: 2021-07-27 | Stop reason: SDUPTHER

## 2021-07-26 RX ORDER — DIPHENHYDRAMINE HCL 25 MG
25 TABLET ORAL EVERY 4 HOURS PRN
Status: DISCONTINUED | OUTPATIENT
Start: 2021-07-26 | End: 2021-07-27

## 2021-07-26 RX ORDER — LIDOCAINE HYDROCHLORIDE 10 MG/ML
30 INJECTION, SOLUTION EPIDURAL; INFILTRATION; INTRACAUDAL; PERINEURAL PRN
Status: DISCONTINUED | OUTPATIENT
Start: 2021-07-26 | End: 2021-07-27

## 2021-07-26 RX ORDER — SODIUM CHLORIDE, SODIUM LACTATE, POTASSIUM CHLORIDE, CALCIUM CHLORIDE 600; 310; 30; 20 MG/100ML; MG/100ML; MG/100ML; MG/100ML
INJECTION, SOLUTION INTRAVENOUS CONTINUOUS
Status: DISCONTINUED | OUTPATIENT
Start: 2021-07-26 | End: 2021-07-27

## 2021-07-26 RX ORDER — SODIUM CHLORIDE, SODIUM LACTATE, POTASSIUM CHLORIDE, AND CALCIUM CHLORIDE .6; .31; .03; .02 G/100ML; G/100ML; G/100ML; G/100ML
1000 INJECTION, SOLUTION INTRAVENOUS PRN
Status: DISCONTINUED | OUTPATIENT
Start: 2021-07-26 | End: 2021-07-27

## 2021-07-26 RX ORDER — SODIUM CHLORIDE 9 MG/ML
25 INJECTION, SOLUTION INTRAVENOUS PRN
Status: DISCONTINUED | OUTPATIENT
Start: 2021-07-26 | End: 2021-07-27

## 2021-07-26 RX ORDER — SODIUM CHLORIDE, SODIUM LACTATE, POTASSIUM CHLORIDE, AND CALCIUM CHLORIDE .6; .31; .03; .02 G/100ML; G/100ML; G/100ML; G/100ML
500 INJECTION, SOLUTION INTRAVENOUS PRN
Status: DISCONTINUED | OUTPATIENT
Start: 2021-07-26 | End: 2021-07-27

## 2021-07-26 RX ORDER — MORPHINE SULFATE 10 MG/ML
10 INJECTION, SOLUTION INTRAMUSCULAR; INTRAVENOUS ONCE
Status: COMPLETED | OUTPATIENT
Start: 2021-07-26 | End: 2021-07-26

## 2021-07-26 RX ORDER — SODIUM CHLORIDE 0.9 % (FLUSH) 0.9 %
5-40 SYRINGE (ML) INJECTION EVERY 12 HOURS SCHEDULED
Status: DISCONTINUED | OUTPATIENT
Start: 2021-07-26 | End: 2021-07-27

## 2021-07-26 RX ADMIN — LABETALOL HCL 100 MG: 100 TABLET, FILM COATED ORAL at 21:29

## 2021-07-26 RX ADMIN — SODIUM CHLORIDE, POTASSIUM CHLORIDE, SODIUM LACTATE AND CALCIUM CHLORIDE: 600; 310; 30; 20 INJECTION, SOLUTION INTRAVENOUS at 16:07

## 2021-07-26 RX ADMIN — MORPHINE SULFATE 10 MG: 10 INJECTION INTRAVENOUS at 22:57

## 2021-07-26 RX ADMIN — PROMETHAZINE HYDROCHLORIDE 25 MG: 25 INJECTION INTRAMUSCULAR; INTRAVENOUS at 22:57

## 2021-07-26 RX ADMIN — SODIUM CHLORIDE, POTASSIUM CHLORIDE, SODIUM LACTATE AND CALCIUM CHLORIDE: 600; 310; 30; 20 INJECTION, SOLUTION INTRAVENOUS at 18:21

## 2021-07-26 RX ADMIN — Medication 50 MCG: at 22:25

## 2021-07-26 RX ADMIN — Medication 25 MCG: at 16:25

## 2021-07-26 RX ADMIN — ACETAMINOPHEN 1000 MG: 500 TABLET ORAL at 18:21

## 2021-07-26 ASSESSMENT — PAIN SCALES - GENERAL
PAINLEVEL_OUTOF10: 8
PAINLEVEL_OUTOF10: 5

## 2021-07-26 NOTE — H&P
OBSTETRICAL HISTORY Formerly [facility]    Date: 2021       Time: 3:56 PM   Patient Name: Brian Reyes     Patient : 1995  Room/Bed: [facility]/0705-    Admission Date/Time: 2021  2:20 PM      CC: IOL 2/2 chronic hypertension (on medications)     HPI: Brian Reyes is a 27 y.o. Giovanni Fear at 37w0d who presents for IOL 2/2 chronic hypertension on medications. Patient denies any fever, chills, N/V, headaches, vision changes, chest pain, shortness of breath, RUQ pain, abdominal pain. Patient denies any vaginal discharge and any urinary complaints. The patient reports fetal movement is present, denies contractions, denies loss of fluid, denies vaginal bleeding. Patient is [place]guill[place] speaking and  was used. DATING:  LMP: Patient's last menstrual period was 11/15/2020 (exact date).   Estimated Date of Delivery: 21   Based on: ultrasound, at 15 1/7 weeks GA    PREGNANCY RISK FACTORS:  Patient Active Problem List   Diagnosis    Abnormal quad screen    Hypertension affecting pregnancy in third trimester    Hypothyroid in pregnancy, antepartum, third trimester    37 weeks gestation of pregnancy        Steroids Given In This Pregnancy:  no     REVIEW OF SYSTEMS:   Constitutional: negative fever, negative chills  HEENT: negative visual disturbances, negative headaches  Respiratory: negative dyspnea, negative cough  Cardiovascular: negative chest pain,  negative palpitations  Gastrointestinal: negative abdominal pain, negative RUQ pain, negative N/V, negative diarrhea, negative constipation  Genitourinary: negative dysuria, negative vaginal discharge, negative vaginal bleeding  Dermatological: negative rash  Hematologic: negative bruising  Immunologic/Lymphatic: negative recent illness, negative recent sick contact  Musculoskeletal: negative back pain, negative myalgias, negative arthralgias  Neurological:  negative dizziness, negative weakness  Behavior/Psych: negative depression, negative anxiety    OBSTETRICAL HISTORY:   OB History    Para Term  AB Living   2 0 0 0 1 0   SAB TAB Ectopic Molar Multiple Live Births   0 1 0 0 0 0      # Outcome Date GA Lbr Valente/2nd Weight Sex Delivery Anes PTL Lv   2 Current            1 TAB                PAST MEDICAL HISTORY:   has a past medical history of Hypertension and Hypothyroid in pregnancy, antepartum, third trimester. PAST SURGICAL HISTORY:   has no past surgical history on file. ALLERGIES:  has No Known Allergies. MEDICATIONS:  Prior to Admission medications    Medication Sig Start Date End Date Taking? Authorizing Provider   aspirin EC 81 MG EC tablet Take 1 tablet by mouth daily  Patient not taking: Reported on 2021   TWAN Daugherty CNP   Prenatal Vit-Fe Fumarate-FA (PNV PRENATAL PLUS MULTIVITAMIN) 27-1 MG TABS Take 1 tablet by mouth daily 21  TWAN Daugherty CNP   labetalol (NORMODYNE) 100 MG tablet Take 1 tablet by mouth 2 times daily 21  Alison Murphy MD   butalbital-acetaminophen-caffeine (FIORICET, ESGIC) -51 MG per tablet Take 1 tablet by mouth every 6 hours as needed for Headaches 21   Alison Murphy MD   aspirin 81 MG chewable tablet Take 81 mg by mouth daily 3/19/21 10/25/21  Historical Provider, MD       FAMILY HISTORY:  family history includes Hypertension in her maternal grandmother; No Known Problems in her brother, father, maternal grandfather, mother, sister, and sister. SOCIAL HISTORY:   reports that she has never smoked. She has never used smokeless tobacco. She reports that she does not drink alcohol and does not use drugs.     VITALS:  Vitals:    21 1432   BP: (!) 149/71   Pulse: 109   Resp: 16   Temp: 98.4 °F (36.9 °C)         PHYSICAL EXAM:  Fetal Heart Monitor:  Baseline Heart Rate 145, moderate variability, present accelerations, absent decelerations  [place]: none contractions    General appearance:  no apparent distress, alert, and cooperative  HEENT: head atraumatic, normocephalic, moist mucous membranes, trachea midline  Neurologic:  alert, oriented, normal speech, no focal findings or movement disorder noted  Lungs:  No increased work of breathing, good air exchange, clear to auscultation bilaterally, no crackles or wheezing  Heart:  regular rate and rhythm and no murmur    Abdomen:  soft, gravid, non-tender, no rebound, guarding, or rigidity, and no RUQ or epigastric tenderness  Extremities:  no calf tenderness, non edematous, DTR's: +2/4 bilateral lower extremities   Musculoskeletal: Gross strength equal and intact throughout, no gross abnormalities, range of motion normal in hips, knees, shoulders and spine, CVA tenderness: none  Psychiatric: Mood appropriate, normal affect   Rectal Exam: not indicated  Sterile Vaginal Exam:  Cervix: No cervical motion tenderness   Uterus: Is gravid, Normal size, shape, consistency and non-tender   Adnexa: Non-tender, no palpable masses  Cervix: 1 cm dilated, 30 % effaced, -3 station, mid position (out of 3 station), medium consistency, FETAL POSITION: Cephalic (confirmed by ultrasound), Membranes intact,    Bishops Score: 3     0 1 2 3   Position Posterior Intermediate Anterior -   Consistency Firm Intermediate Soft -   Effacement 0-30% 31-50% 51-80% >80%   Dilation 0cm 1-2cm 3-4cm >5cm   Fetal Station -3 -2 -1, 0 +1, +2           OMM EXAM:  Chief Complaint: Pregnancy  Reason for No Exam (if applicable): not applicable  Anterior/ Posterior Spinal Curves: Lumbar Lordosis -  Slightly increased  Assessment Tool: T= Tenderness, A= Asymmetry, R= Restricted Motion (A=Active, P=Passive), T=Tissue Texture Changes  Region Evaluated : Severity / Specific of Major Somatic Dysfunction: M99.03 Lumbar -  Minor TART  Major Correlations with: Gravid  Structural Diagnosis: Lumbar lordosis slightly increased 2/2 pregnancy  Treatment Plan: Outpatient LIMITED BEDSIDE US:  Position: Cephalic  Placental Location: anterior  Fetal Heart Tones: Present  Fetal Movement: Present  Amniotic Fluid Index/Volume: >2x2 cm MVP  Estimated Fetal Weight:  6 lbs 7oz on office US 21    PRENATAL LAB RESULTS:   Blood Type/Rh: O pos  Antibody Screen: negative  Hemoglobin, Hematocrit, Platelets: 65.5 / 00.1 / 260  Rubella: immune  T. Pallidum, IgG: non-reactive   Hepatitis B Surface Antigen: non-reactive   Hepatitis C antibody: not available  HIV: non-reactive   Sickle Cell Screen: negative  Gonorrhea: negative  Chlamydia: negative  Urine culture: not available    1 hour Glucose Tolerance Test: 111      Group B Strep: negative  Cystic Fibrosis Screen: negative  First Trimester Screen: not available  MSAFP/Multiple Markers: positive risk for down syndrome, 1:59  Non-Invasive Prenatal Testing: unable to access in Care Everywhere  Anatomy US: normal female anatomy, 3vc, normal cord insertion, anterior placenta    ASSESSMENT & PLAN:  Daniele Flores is a 27 y.o. female  at 37w0d   - GBS negative / Rh negative / R immune   - No indication for GBS prophylaxis      IOL 2/2 cHTN (on meds)  - Admit to labor and delivery under the service of Dr. Sophia Malagon   - VSS, initial blood pressure elevated but not severe range   - cEFM and TOCO   - Cat 1 FHT and TOCO showing no contractions   - CBC, T&S, T.Pal, COVID ordered   - UDS ordered. R/B/A discussed with patient and patient agreeable   - Patient has not been taking her Labetalol 100mg BID for the past month, ordered while inpatient.   - Denies s/s preE   - Preeclampsia labs and P/C ordered   - IVF: LR @ 125mL/hr   - Plan of Induction: Cytotec 25 PV x1   - Continue expectant management       Abnormal quad screen   - Increased risk T21, 1:59   - Anatomy scan wnl   - NIPT drawn at [address], unable to see results in [facility], patient reports negative.       Hypothyroidism   - Patient with history of hypothyroidism, not on any medications.   - TSH 0.17, 6/21/21      Hx headaches   - Clinically asymptomatic   - Fioricet PRN throughout the pregnancy     Italian speaking   -  used      BMI 35        Patient Active Problem List    Diagnosis Date Noted    37 weeks gestation of pregnancy 07/26/2021    Hypertension affecting pregnancy in third trimester 06/21/2021    Hypothyroid in pregnancy, antepartum, third trimester 06/21/2021    Abnormal quad screen 03/10/2021       Plan discussed with Dr. Jakob Noe, who is agreeable. Steroids given this admission: No    Risks, benefits, alternatives and possible complications have been discussed in detail with the patient. Admission, and post admission procedures and expectations were discussed in detail. All questions were answered.     Attending's Name: Dr. Rey Brush DO  Ob/Gyn Resident  7/26/2021, 3:56 PM

## 2021-07-26 NOTE — CARE COORDINATION
ANTEPARTUM DISCHARGE PLANNING NOTE    37 weeks gestation of pregnancy [Z3A.37]    Osiris was admitted to L&D today for IOL 2/2 chronic hypertension (on medications) at 37w0d     CM to meet w/ patient after delivery

## 2021-07-26 NOTE — DISCHARGE SUMMARY
Obstetric Discharge Summary  [facility]    Patient Name: Samson Mcclain  Patient : 1995  Primary Care Physician: No primary care provider on file. Admit Date: 2021    Principal Diagnosis: IUP at 37w0d, admitted for Induction of Labor 2/2 cHTN (on meds)     Her pregnancy has been complicated by:   Patient Active Problem List   Diagnosis    Abnormal quad screen    Hypertension affecting pregnancy in third trimester    Hypothyroid in pregnancy, antepartum, third trimester    37 weeks gestation of pregnancy    Chorioamnionitis in third trimester    PLTCS (gen) 21 F Apg 5/8 Wt 6#6    Post-operative state       Infection Present?: Yes- chorioamnionitis  Hospital Acquired: No    Surgical Operations & Procedures:  Analgesia: epidural  Delivery Type:  Delivery: See Labor and Delivery Summary   Laceration(s): Absent    Consultations: Anesthesia    Pertinent Findings & Procedures:   Samson Mcclain is a 27 y.o. female  at 37w0d admitted for IOL 2/2 cHTN (on meds) PreE labs were WNL P/C 0.16; Received Cytotec 25 PV x1, 50 PO x1, Morphine/Phenergan x1, huerta balloon, nitrous, pitocin, AROM (clr), epidural. FHT showed persistent Tachycardia as high as 200 bpm non responsive to intrauterine resuscitation. The patient met criteria for chorioamnionitis and was started on Amp/Gent/Clinda. Decision was made to proceed with C/S 2/2 Cat II tracing (tachy). She delivered by primary low transverse  a Live Born infant on 21. Information for the patient's :  Rebecca Davenport Lilcassidy Holmhouse [[id number]]   female   Birth Weight: 6 lb 6.7 oz (2.91 kg)       Apgars: 5 at 1 minute and 8 at 5 minutes. Postpartum course: normal.    POD#1: Hgb 9.1, WBC 18.5  Amp/Gent/Clinda course completed  Iron supplementation upon discharge    POD#2: Hgb 9.8, WBC 15.5.      Course of patient: complicated by Chorioamnionitis     Discharge to: Home    Readmission planned: no     Recommendations on Discharge:     Medications:      Medication List      START taking these medications    docusate sodium 100 MG capsule  Commonly known as: COLACE  Take 1 capsule by mouth 2 times daily     ferrous sulfate 325 (65 Fe) MG EC tablet  Commonly known as: Fe Tabs  Take 1 tablet by mouth 2 times daily     ibuprofen 600 MG tablet  Commonly known as: ADVIL;MOTRIN  Take 1 tablet by mouth every 6 hours as needed for Pain     ondansetron 4 MG disintegrating tablet  Commonly known as: ZOFRAN-ODT  Take 1 tablet by mouth 3 times daily as needed for Nausea or Vomiting     oxyCODONE-acetaminophen 5-325 MG per tablet  Commonly known as: Percocet  Take 1 tablet by mouth every 6 hours as needed for Pain for up to 7 days. Intended supply: 7 days. Take lowest dose possible to manage pain        CONTINUE taking these medications    labetalol 100 MG tablet  Commonly known as: NORMODYNE  Take 1 tablet by mouth 2 times daily     PNV Prenatal Plus Multivitamin 27-1 MG Tabs  Take 1 tablet by mouth daily        STOP taking these medications    aspirin 81 MG chewable tablet     aspirin EC 81 MG EC tablet     butalbital-acetaminophen-caffeine -40 MG per tablet  Commonly known as: FIORICET, ESGIC           Where to Get Your Medications      You can get these medications from any pharmacy    Bring a paper prescription for each of these medications  · docusate sodium 100 MG capsule  · ferrous sulfate 325 (65 Fe) MG EC tablet  · ibuprofen 600 MG tablet  · ondansetron 4 MG disintegrating tablet  · oxyCODONE-acetaminophen 5-325 MG per tablet           Activity: pelvic rest x 6 weeks, no driving on narcotics, no lifting greater than 15 lbs  Diet: regular diet  Follow up: 1 weeks     Condition on discharge: stable    Discharge date: 07/31/21      Gerhard Stephenson DO  Ob/Gyn Resident    Comments:  Home care and follow-up care were reviewed. Pelvic rest, and birth control were reviewed.  Signs and symptoms of mastitis and post partum depression were reviewed. The patient is to notify her physician if any of these occur. The patient was counseled on secondary smoke risks and the increased risk of sudden infant death syndrome and respiratory problems to her baby with exposure. She was counseled on various alternate recommendations to decrease the exposure to secondary smoke to her children.

## 2021-07-27 ENCOUNTER — ANESTHESIA (OUTPATIENT)
Dept: LABOR AND DELIVERY | Age: 26
DRG: 540 | End: 2021-07-27
Payer: MEDICARE

## 2021-07-27 ENCOUNTER — ANESTHESIA EVENT (OUTPATIENT)
Dept: LABOR AND DELIVERY | Age: 26
DRG: 540 | End: 2021-07-27
Payer: MEDICARE

## 2021-07-27 VITALS — OXYGEN SATURATION: 96 % | SYSTOLIC BLOOD PRESSURE: 118 MMHG | DIASTOLIC BLOOD PRESSURE: 46 MMHG | TEMPERATURE: 99.4 F

## 2021-07-27 LAB
AMPHETAMINE SCREEN URINE: NEGATIVE
BARBITURATE SCREEN URINE: NEGATIVE
BENZODIAZEPINE SCREEN, URINE: NEGATIVE
BUPRENORPHINE URINE: NORMAL
CANNABINOID SCREEN URINE: NEGATIVE
COCAINE METABOLITE, URINE: NEGATIVE
CREATININE URINE: 115.6 MG/DL (ref 28–217)
MDMA URINE: NORMAL
METHADONE SCREEN, URINE: NEGATIVE
METHAMPHETAMINE, URINE: NORMAL
OPIATES, URINE: NEGATIVE
OXYCODONE SCREEN URINE: NEGATIVE
PHENCYCLIDINE, URINE: NEGATIVE
PROPOXYPHENE, URINE: NORMAL
TEST INFORMATION: NORMAL
TOTAL PROTEIN, URINE: 19 MG/DL
TRICYCLIC ANTIDEPRESSANTS, UR: NORMAL
URINE TOTAL PROTEIN CREATININE RATIO: 0.16 (ref 0–0.2)

## 2021-07-27 PROCEDURE — 2580000003 HC RX 258: Performed by: STUDENT IN AN ORGANIZED HEALTH CARE EDUCATION/TRAINING PROGRAM

## 2021-07-27 PROCEDURE — 59050 FETAL MONITOR W/REPORT: CPT

## 2021-07-27 PROCEDURE — 6370000000 HC RX 637 (ALT 250 FOR IP): Performed by: STUDENT IN AN ORGANIZED HEALTH CARE EDUCATION/TRAINING PROGRAM

## 2021-07-27 PROCEDURE — 59514 CESAREAN DELIVERY ONLY: CPT | Performed by: OBSTETRICS & GYNECOLOGY

## 2021-07-27 PROCEDURE — 87205 SMEAR GRAM STAIN: CPT

## 2021-07-27 PROCEDURE — 6360000002 HC RX W HCPCS: Performed by: NURSE ANESTHETIST, CERTIFIED REGISTERED

## 2021-07-27 PROCEDURE — 87075 CULTR BACTERIA EXCEPT BLOOD: CPT

## 2021-07-27 PROCEDURE — 6360000002 HC RX W HCPCS: Performed by: STUDENT IN AN ORGANIZED HEALTH CARE EDUCATION/TRAINING PROGRAM

## 2021-07-27 PROCEDURE — 2500000003 HC RX 250 WO HCPCS: Performed by: STUDENT IN AN ORGANIZED HEALTH CARE EDUCATION/TRAINING PROGRAM

## 2021-07-27 PROCEDURE — 2500000003 HC RX 250 WO HCPCS: Performed by: NURSE ANESTHETIST, CERTIFIED REGISTERED

## 2021-07-27 PROCEDURE — 96374 THER/PROPH/DIAG INJ IV PUSH: CPT

## 2021-07-27 PROCEDURE — 3700000000 HC ANESTHESIA ATTENDED CARE: Performed by: OBSTETRICS & GYNECOLOGY

## 2021-07-27 PROCEDURE — 2709999900 HC NON-CHARGEABLE SUPPLY: Performed by: OBSTETRICS & GYNECOLOGY

## 2021-07-27 PROCEDURE — 87070 CULTURE OTHR SPECIMN AEROBIC: CPT

## 2021-07-27 PROCEDURE — 3609079900 HC CESAREAN SECTION: Performed by: OBSTETRICS & GYNECOLOGY

## 2021-07-27 PROCEDURE — 1220000000 HC SEMI PRIVATE OB R&B

## 2021-07-27 PROCEDURE — 6360000002 HC RX W HCPCS: Performed by: ANESTHESIOLOGY

## 2021-07-27 PROCEDURE — 2580000003 HC RX 258: Performed by: NURSE ANESTHETIST, CERTIFIED REGISTERED

## 2021-07-27 PROCEDURE — 88307 TISSUE EXAM BY PATHOLOGIST: CPT

## 2021-07-27 PROCEDURE — 3700000001 HC ADD 15 MINUTES (ANESTHESIA): Performed by: OBSTETRICS & GYNECOLOGY

## 2021-07-27 PROCEDURE — 7100000001 HC PACU RECOVERY - ADDTL 15 MIN: Performed by: OBSTETRICS & GYNECOLOGY

## 2021-07-27 PROCEDURE — 3700000025 EPIDURAL BLOCK: Performed by: ANESTHESIOLOGY

## 2021-07-27 PROCEDURE — 7100000000 HC PACU RECOVERY - FIRST 15 MIN: Performed by: OBSTETRICS & GYNECOLOGY

## 2021-07-27 RX ORDER — TRISODIUM CITRATE DIHYDRATE AND CITRIC ACID MONOHYDRATE 500; 334 MG/5ML; MG/5ML
30 SOLUTION ORAL ONCE
Status: COMPLETED | OUTPATIENT
Start: 2021-07-27 | End: 2021-07-27

## 2021-07-27 RX ORDER — DEXAMETHASONE SODIUM PHOSPHATE 10 MG/ML
INJECTION INTRAMUSCULAR; INTRAVENOUS PRN
Status: DISCONTINUED | OUTPATIENT
Start: 2021-07-27 | End: 2021-07-27 | Stop reason: SDUPTHER

## 2021-07-27 RX ORDER — PROMETHAZINE HYDROCHLORIDE 25 MG/ML
25 INJECTION, SOLUTION INTRAMUSCULAR; INTRAVENOUS ONCE
Status: COMPLETED | OUTPATIENT
Start: 2021-07-27 | End: 2021-07-27

## 2021-07-27 RX ORDER — DOCUSATE SODIUM 100 MG/1
100 CAPSULE, LIQUID FILLED ORAL DAILY
Status: DISCONTINUED | OUTPATIENT
Start: 2021-07-27 | End: 2021-07-31 | Stop reason: HOSPADM

## 2021-07-27 RX ORDER — ONDANSETRON 2 MG/ML
INJECTION INTRAMUSCULAR; INTRAVENOUS PRN
Status: DISCONTINUED | OUTPATIENT
Start: 2021-07-27 | End: 2021-07-27 | Stop reason: SDUPTHER

## 2021-07-27 RX ORDER — OXYCODONE HYDROCHLORIDE AND ACETAMINOPHEN 5; 325 MG/1; MG/1
2 TABLET ORAL EVERY 4 HOURS PRN
Status: DISCONTINUED | OUTPATIENT
Start: 2021-07-27 | End: 2021-07-31 | Stop reason: HOSPADM

## 2021-07-27 RX ORDER — LANOLIN 72 %
OINTMENT (GRAM) TOPICAL
Status: DISCONTINUED | OUTPATIENT
Start: 2021-07-27 | End: 2021-07-31 | Stop reason: HOSPADM

## 2021-07-27 RX ORDER — MORPHINE SULFATE 1 MG/ML
INJECTION, SOLUTION EPIDURAL; INTRATHECAL; INTRAVENOUS PRN
Status: DISCONTINUED | OUTPATIENT
Start: 2021-07-27 | End: 2021-07-27 | Stop reason: SDUPTHER

## 2021-07-27 RX ORDER — ROPIVACAINE HYDROCHLORIDE 2 MG/ML
INJECTION, SOLUTION EPIDURAL; INFILTRATION; PERINEURAL PRN
Status: DISCONTINUED | OUTPATIENT
Start: 2021-07-27 | End: 2021-07-27 | Stop reason: SDUPTHER

## 2021-07-27 RX ORDER — CLINDAMYCIN PHOSPHATE 900 MG/50ML
900 INJECTION INTRAVENOUS ONCE
Status: COMPLETED | OUTPATIENT
Start: 2021-07-27 | End: 2021-07-27

## 2021-07-27 RX ORDER — POLYETHYLENE GLYCOL 3350 17 G/17G
17 POWDER, FOR SOLUTION ORAL DAILY PRN
Status: DISCONTINUED | OUTPATIENT
Start: 2021-07-27 | End: 2021-07-31 | Stop reason: HOSPADM

## 2021-07-27 RX ORDER — SODIUM CHLORIDE 0.9 % (FLUSH) 0.9 %
10 SYRINGE (ML) INJECTION PRN
Status: DISCONTINUED | OUTPATIENT
Start: 2021-07-27 | End: 2021-07-31 | Stop reason: HOSPADM

## 2021-07-27 RX ORDER — SODIUM CHLORIDE, SODIUM LACTATE, POTASSIUM CHLORIDE, CALCIUM CHLORIDE 600; 310; 30; 20 MG/100ML; MG/100ML; MG/100ML; MG/100ML
INJECTION, SOLUTION INTRAVENOUS CONTINUOUS
Status: DISCONTINUED | OUTPATIENT
Start: 2021-07-27 | End: 2021-07-31 | Stop reason: HOSPADM

## 2021-07-27 RX ORDER — CLINDAMYCIN PHOSPHATE 900 MG/50ML
900 INJECTION INTRAVENOUS EVERY 8 HOURS
Status: COMPLETED | OUTPATIENT
Start: 2021-07-27 | End: 2021-07-28

## 2021-07-27 RX ORDER — IBUPROFEN 600 MG/1
600 TABLET ORAL EVERY 6 HOURS
Status: DISCONTINUED | OUTPATIENT
Start: 2021-07-28 | End: 2021-07-31 | Stop reason: HOSPADM

## 2021-07-27 RX ORDER — ONDANSETRON 4 MG/1
4 TABLET, ORALLY DISINTEGRATING ORAL 3 TIMES DAILY PRN
Qty: 21 TABLET | Refills: 0 | Status: SHIPPED | OUTPATIENT
Start: 2021-07-27

## 2021-07-27 RX ORDER — ROPIVACAINE HYDROCHLORIDE 2 MG/ML
INJECTION, SOLUTION EPIDURAL; INFILTRATION; PERINEURAL
Status: COMPLETED
Start: 2021-07-27 | End: 2021-07-27

## 2021-07-27 RX ORDER — DIPHENHYDRAMINE HYDROCHLORIDE 50 MG/ML
25 INJECTION INTRAMUSCULAR; INTRAVENOUS EVERY 6 HOURS PRN
Status: DISCONTINUED | OUTPATIENT
Start: 2021-07-27 | End: 2021-07-31 | Stop reason: HOSPADM

## 2021-07-27 RX ORDER — VITAMIN A, ASCORBIC ACID, CHOLECALCIFEROL, .ALPHA.-TOCOPHEROL ACETATE, DL-, THIAMINE MONONITRATE, RIBOFLAVIN, NIACINAMIDE, PYRIDOXINE HYDROCHLORIDE, FOLIC ACID, CYANOCOBALAMIN, CALCIUM CARBONATE, IRON, ZINC OXIDE, AND CUPRIC OXIDE 4000; 120; 400; 22; 1.84; 3; 20; 10; 1; 12; 200; 29; 25; 2 [IU]/1; MG/1; [IU]/1; [IU]/1; MG/1; MG/1; MG/1; MG/1; MG/1; UG/1; MG/1; MG/1; MG/1; MG/1
1 TABLET ORAL DAILY
Status: DISCONTINUED | OUTPATIENT
Start: 2021-07-27 | End: 2021-07-31 | Stop reason: HOSPADM

## 2021-07-27 RX ORDER — LIDOCAINE HYDROCHLORIDE 20 MG/ML
INJECTION, SOLUTION EPIDURAL; INFILTRATION; INTRACAUDAL; PERINEURAL PRN
Status: DISCONTINUED | OUTPATIENT
Start: 2021-07-27 | End: 2021-07-27 | Stop reason: SDUPTHER

## 2021-07-27 RX ORDER — CHLOROPROCAINE HYDROCHLORIDE 30 MG/ML
INJECTION, SOLUTION EPIDURAL; INFILTRATION; INTRACAUDAL; PERINEURAL PRN
Status: DISCONTINUED | OUTPATIENT
Start: 2021-07-27 | End: 2021-07-27 | Stop reason: SDUPTHER

## 2021-07-27 RX ORDER — DOCUSATE SODIUM 100 MG/1
100 CAPSULE, LIQUID FILLED ORAL 2 TIMES DAILY
Qty: 60 CAPSULE | Refills: 1 | Status: SHIPPED | OUTPATIENT
Start: 2021-07-27 | End: 2021-08-26

## 2021-07-27 RX ORDER — LIDOCAINE HYDROCHLORIDE AND EPINEPHRINE 15; 5 MG/ML; UG/ML
INJECTION, SOLUTION EPIDURAL PRN
Status: DISCONTINUED | OUTPATIENT
Start: 2021-07-27 | End: 2021-07-27 | Stop reason: SDUPTHER

## 2021-07-27 RX ORDER — SEVOFLURANE 250 ML/250ML
1 LIQUID RESPIRATORY (INHALATION) CONTINUOUS PRN
Status: DISCONTINUED | OUTPATIENT
Start: 2021-07-27 | End: 2021-07-27

## 2021-07-27 RX ORDER — OXYCODONE HYDROCHLORIDE AND ACETAMINOPHEN 5; 325 MG/1; MG/1
1 TABLET ORAL EVERY 4 HOURS PRN
Status: DISCONTINUED | OUTPATIENT
Start: 2021-07-27 | End: 2021-07-31 | Stop reason: HOSPADM

## 2021-07-27 RX ORDER — ONDANSETRON 2 MG/ML
4 INJECTION INTRAMUSCULAR; INTRAVENOUS EVERY 6 HOURS PRN
Status: DISCONTINUED | OUTPATIENT
Start: 2021-07-27 | End: 2021-07-27

## 2021-07-27 RX ORDER — SUCCINYLCHOLINE/SOD CL,ISO/PF 100 MG/5ML
SYRINGE (ML) INTRAVENOUS PRN
Status: DISCONTINUED | OUTPATIENT
Start: 2021-07-27 | End: 2021-07-27 | Stop reason: SDUPTHER

## 2021-07-27 RX ORDER — SIMETHICONE 80 MG
80 TABLET,CHEWABLE ORAL EVERY 6 HOURS PRN
Status: DISCONTINUED | OUTPATIENT
Start: 2021-07-27 | End: 2021-07-31 | Stop reason: HOSPADM

## 2021-07-27 RX ORDER — SODIUM CHLORIDE, SODIUM LACTATE, POTASSIUM CHLORIDE, CALCIUM CHLORIDE 600; 310; 30; 20 MG/100ML; MG/100ML; MG/100ML; MG/100ML
INJECTION, SOLUTION INTRAVENOUS CONTINUOUS PRN
Status: DISCONTINUED | OUTPATIENT
Start: 2021-07-27 | End: 2021-07-27 | Stop reason: SDUPTHER

## 2021-07-27 RX ORDER — IBUPROFEN 600 MG/1
600 TABLET ORAL EVERY 6 HOURS PRN
Qty: 30 TABLET | Refills: 0 | Status: SHIPPED | OUTPATIENT
Start: 2021-07-27

## 2021-07-27 RX ORDER — PROPOFOL 10 MG/ML
INJECTION, EMULSION INTRAVENOUS PRN
Status: DISCONTINUED | OUTPATIENT
Start: 2021-07-27 | End: 2021-07-27 | Stop reason: SDUPTHER

## 2021-07-27 RX ORDER — MIDAZOLAM HYDROCHLORIDE 1 MG/ML
INJECTION INTRAMUSCULAR; INTRAVENOUS PRN
Status: DISCONTINUED | OUTPATIENT
Start: 2021-07-27 | End: 2021-07-27 | Stop reason: SDUPTHER

## 2021-07-27 RX ORDER — BISACODYL 10 MG
10 SUPPOSITORY, RECTAL RECTAL DAILY PRN
Status: DISCONTINUED | OUTPATIENT
Start: 2021-07-27 | End: 2021-07-31 | Stop reason: HOSPADM

## 2021-07-27 RX ORDER — OXYCODONE HYDROCHLORIDE AND ACETAMINOPHEN 5; 325 MG/1; MG/1
1 TABLET ORAL EVERY 4 HOURS PRN
Status: DISCONTINUED | OUTPATIENT
Start: 2021-07-28 | End: 2021-07-27

## 2021-07-27 RX ORDER — SODIUM CHLORIDE 9 MG/ML
25 INJECTION, SOLUTION INTRAVENOUS PRN
Status: DISCONTINUED | OUTPATIENT
Start: 2021-07-27 | End: 2021-07-31 | Stop reason: HOSPADM

## 2021-07-27 RX ORDER — OXYCODONE HYDROCHLORIDE AND ACETAMINOPHEN 5; 325 MG/1; MG/1
2 TABLET ORAL EVERY 4 HOURS PRN
Status: DISCONTINUED | OUTPATIENT
Start: 2021-07-28 | End: 2021-07-27

## 2021-07-27 RX ORDER — NALBUPHINE HCL 10 MG/ML
5 AMPUL (ML) INJECTION EVERY 4 HOURS PRN
Status: DISCONTINUED | OUTPATIENT
Start: 2021-07-27 | End: 2021-07-27

## 2021-07-27 RX ORDER — PHENYLEPHRINE HYDROCHLORIDE 10 MG/ML
INJECTION INTRAVENOUS PRN
Status: DISCONTINUED | OUTPATIENT
Start: 2021-07-27 | End: 2021-07-27 | Stop reason: SDUPTHER

## 2021-07-27 RX ORDER — BUPIVACAINE HYDROCHLORIDE 7.5 MG/ML
INJECTION, SOLUTION INTRASPINAL PRN
Status: DISCONTINUED | OUTPATIENT
Start: 2021-07-27 | End: 2021-07-27 | Stop reason: SDUPTHER

## 2021-07-27 RX ORDER — LIDOCAINE HYDROCHLORIDE 10 MG/ML
INJECTION, SOLUTION EPIDURAL; INFILTRATION; INTRACAUDAL; PERINEURAL PRN
Status: DISCONTINUED | OUTPATIENT
Start: 2021-07-27 | End: 2021-07-27 | Stop reason: SDUPTHER

## 2021-07-27 RX ORDER — KETOROLAC TROMETHAMINE 30 MG/ML
30 INJECTION, SOLUTION INTRAMUSCULAR; INTRAVENOUS EVERY 6 HOURS
Status: DISPENSED | OUTPATIENT
Start: 2021-07-27 | End: 2021-07-28

## 2021-07-27 RX ORDER — OXYCODONE HYDROCHLORIDE AND ACETAMINOPHEN 5; 325 MG/1; MG/1
1 TABLET ORAL EVERY 6 HOURS PRN
Qty: 20 TABLET | Refills: 0 | Status: SHIPPED | OUTPATIENT
Start: 2021-07-27 | End: 2021-08-03

## 2021-07-27 RX ORDER — ONDANSETRON 2 MG/ML
4 INJECTION INTRAMUSCULAR; INTRAVENOUS EVERY 6 HOURS PRN
Status: DISCONTINUED | OUTPATIENT
Start: 2021-07-27 | End: 2021-07-31 | Stop reason: HOSPADM

## 2021-07-27 RX ORDER — NALOXONE HYDROCHLORIDE 0.4 MG/ML
0.4 INJECTION, SOLUTION INTRAMUSCULAR; INTRAVENOUS; SUBCUTANEOUS PRN
Status: DISCONTINUED | OUTPATIENT
Start: 2021-07-27 | End: 2021-07-27

## 2021-07-27 RX ORDER — ACETAMINOPHEN 500 MG
1000 TABLET ORAL EVERY 6 HOURS PRN
Status: DISCONTINUED | OUTPATIENT
Start: 2021-07-28 | End: 2021-07-31 | Stop reason: HOSPADM

## 2021-07-27 RX ADMIN — LIDOCAINE HYDROCHLORIDE 3 ML: 10 INJECTION, SOLUTION EPIDURAL; INFILTRATION; INTRACAUDAL; PERINEURAL at 10:38

## 2021-07-27 RX ADMIN — PROMETHAZINE HYDROCHLORIDE 25 MG: 25 INJECTION INTRAMUSCULAR; INTRAVENOUS at 18:53

## 2021-07-27 RX ADMIN — Medication 1 MILLI-UNITS/MIN: at 05:45

## 2021-07-27 RX ADMIN — SODIUM CITRATE AND CITRIC ACID MONOHYDRATE 30 ML: 500; 334 SOLUTION ORAL at 14:22

## 2021-07-27 RX ADMIN — CLINDAMYCIN PHOSPHATE 900 MG: 900 INJECTION, SOLUTION INTRAVENOUS at 14:25

## 2021-07-27 RX ADMIN — SODIUM CHLORIDE, POTASSIUM CHLORIDE, SODIUM LACTATE AND CALCIUM CHLORIDE: 600; 310; 30; 20 INJECTION, SOLUTION INTRAVENOUS at 16:47

## 2021-07-27 RX ADMIN — SODIUM CHLORIDE, POTASSIUM CHLORIDE, SODIUM LACTATE AND CALCIUM CHLORIDE: 600; 310; 30; 20 INJECTION, SOLUTION INTRAVENOUS at 03:05

## 2021-07-27 RX ADMIN — AMPICILLIN SODIUM 2000 MG: 2 INJECTION, POWDER, FOR SOLUTION INTRAMUSCULAR; INTRAVENOUS at 14:23

## 2021-07-27 RX ADMIN — Medication 909 ML/HR: at 14:53

## 2021-07-27 RX ADMIN — DEXAMETHASONE SODIUM PHOSPHATE 10 MG: 10 INJECTION INTRAMUSCULAR; INTRAVENOUS at 14:58

## 2021-07-27 RX ADMIN — DIPHENHYDRAMINE HCL 25 MG: 25 TABLET ORAL at 00:05

## 2021-07-27 RX ADMIN — MIDAZOLAM HYDROCHLORIDE 2 MG: 1 INJECTION, SOLUTION INTRAMUSCULAR; INTRAVENOUS at 14:53

## 2021-07-27 RX ADMIN — ONDANSETRON 4 MG: 2 INJECTION, SOLUTION INTRAMUSCULAR; INTRAVENOUS at 15:18

## 2021-07-27 RX ADMIN — GENTAMICIN SULFATE 481.2 MG: 40 INJECTION, SOLUTION INTRAMUSCULAR; INTRAVENOUS at 15:04

## 2021-07-27 RX ADMIN — CHLOROPROCAINE HYDROCHLORIDE 20 ML: 30 INJECTION, SOLUTION EPIDURAL; INFILTRATION; INTRACAUDAL; PERINEURAL at 14:32

## 2021-07-27 RX ADMIN — ROPIVACAINE HYDROCHLORIDE 10 ML/HR: 2 INJECTION, SOLUTION EPIDURAL; INFILTRATION at 10:56

## 2021-07-27 RX ADMIN — Medication 1 MILLI-UNITS/MIN: at 15:35

## 2021-07-27 RX ADMIN — KETOROLAC TROMETHAMINE 30 MG: 30 INJECTION, SOLUTION INTRAMUSCULAR; INTRAVENOUS at 17:02

## 2021-07-27 RX ADMIN — CLINDAMYCIN IN 5 PERCENT DEXTROSE 900 MG: 18 INJECTION, SOLUTION INTRAVENOUS at 23:54

## 2021-07-27 RX ADMIN — PHENYLEPHRINE HYDROCHLORIDE 100 MCG: 10 INJECTION INTRAVENOUS at 14:45

## 2021-07-27 RX ADMIN — AMPICILLIN SODIUM 2000 MG: 2 INJECTION, POWDER, FOR SOLUTION INTRAMUSCULAR; INTRAVENOUS at 20:48

## 2021-07-27 RX ADMIN — LABETALOL HCL 100 MG: 100 TABLET, FILM COATED ORAL at 09:03

## 2021-07-27 RX ADMIN — BUPIVACAINE HYDROCHLORIDE IN DEXTROSE 1.8 MG: 7.5 INJECTION, SOLUTION SUBARACHNOID at 14:45

## 2021-07-27 RX ADMIN — OXYCODONE HYDROCHLORIDE AND ACETAMINOPHEN 1 TABLET: 5; 325 TABLET ORAL at 20:52

## 2021-07-27 RX ADMIN — LIDOCAINE HYDROCHLORIDE 60 MG: 20 INJECTION, SOLUTION EPIDURAL; INFILTRATION; INTRACAUDAL; PERINEURAL at 14:50

## 2021-07-27 RX ADMIN — SODIUM CHLORIDE, POTASSIUM CHLORIDE, SODIUM LACTATE AND CALCIUM CHLORIDE: 600; 310; 30; 20 INJECTION, SOLUTION INTRAVENOUS at 14:30

## 2021-07-27 RX ADMIN — ROPIVACAINE HYDROCHLORIDE 4 ML: 2 INJECTION, SOLUTION EPIDURAL; INFILTRATION at 10:49

## 2021-07-27 RX ADMIN — Medication 100 MG: at 14:50

## 2021-07-27 RX ADMIN — ROPIVACAINE HYDROCHLORIDE 4 ML: 2 INJECTION, SOLUTION EPIDURAL; INFILTRATION at 10:53

## 2021-07-27 RX ADMIN — MORPHINE SULFATE 0.2 MG: 1 INJECTION, SOLUTION EPIDURAL; INTRATHECAL; INTRAVENOUS at 14:45

## 2021-07-27 RX ADMIN — PROPOFOL INJECTABLE EMULSION 125 MG: 10 INJECTION, EMULSION INTRAVENOUS at 14:50

## 2021-07-27 RX ADMIN — LIDOCAINE HYDROCHLORIDE,EPINEPHRINE BITARTRATE 3 ML: 15; .005 INJECTION, SOLUTION EPIDURAL; INFILTRATION; INTRACAUDAL; PERINEURAL at 10:44

## 2021-07-27 ASSESSMENT — PULMONARY FUNCTION TESTS
PIF_VALUE: 12
PIF_VALUE: 0
PIF_VALUE: 5
PIF_VALUE: 0
PIF_VALUE: 0
PIF_VALUE: 20
PIF_VALUE: 12
PIF_VALUE: 12
PIF_VALUE: 8
PIF_VALUE: 12
PIF_VALUE: 13
PIF_VALUE: 4
PIF_VALUE: 0
PIF_VALUE: 12
PIF_VALUE: 12
PIF_VALUE: 0
PIF_VALUE: 19
PIF_VALUE: 6
PIF_VALUE: 13
PIF_VALUE: 12
PIF_VALUE: 0
PIF_VALUE: 4
PIF_VALUE: 0
PIF_VALUE: 21
PIF_VALUE: 11
PIF_VALUE: 21
PIF_VALUE: 0
PIF_VALUE: 12
PIF_VALUE: 12
PIF_VALUE: 0
PIF_VALUE: 0
PIF_VALUE: 3
PIF_VALUE: 48
PIF_VALUE: 2
PIF_VALUE: 11
PIF_VALUE: 0
PIF_VALUE: 11
PIF_VALUE: 23
PIF_VALUE: 5
PIF_VALUE: 0
PIF_VALUE: 3
PIF_VALUE: 2
PIF_VALUE: 12
PIF_VALUE: 13
PIF_VALUE: 7
PIF_VALUE: 10
PIF_VALUE: 0
PIF_VALUE: 1
PIF_VALUE: 19
PIF_VALUE: 2
PIF_VALUE: 21
PIF_VALUE: 12
PIF_VALUE: 12
PIF_VALUE: 13
PIF_VALUE: 12
PIF_VALUE: 19
PIF_VALUE: 17
PIF_VALUE: 0
PIF_VALUE: 7
PIF_VALUE: 16
PIF_VALUE: 0
PIF_VALUE: 0
PIF_VALUE: 3

## 2021-07-27 ASSESSMENT — PAIN SCALES - GENERAL
PAINLEVEL_OUTOF10: 4
PAINLEVEL_OUTOF10: 4

## 2021-07-27 NOTE — FLOWSHEET NOTE
Writer to room to check on huerta balloon and tug on it. Pt states she does not want it tugged on at this time. Residents are aware.

## 2021-07-27 NOTE — PROGRESS NOTES
Labor Progress Note    Renetta Martinez is a 27 y.o. female  at 42w4d  The patient was seen and examined. Her pain is well controlled. She reports fetal movement is present, complains of contractions, complains of loss of fluid, denies vaginal bleeding.        Vital Signs:  Vitals:    21 0601 21 0659 21 0801 21 0901   BP: (!) 150/76 (!) 114/56 118/70 130/62   Pulse: 93 98 93 104   Resp: 18 18 18    Temp: 97.5 °F (36.4 °C)  99.1 °F (37.3 °C)    SpO2: 99%            FHT: 155, moderate variability, accelerations present, decelerations absent  Contractions: regular, every 2 minutes    Chaperone for Intimate Exam: Chaperone was present for entire exam, Chaperone Name: Lauryn HOOPER  Cervical Exam: 5 cm dilated, 70 effaced, 0 station  Pitocin: @ 10 mu/min    Membranes: Ruptured clear fluid  Scalp Electrode in place: absent  Intrauterine Pressure Catheter in Place: absent    Interventions: none    Assessment/Plan:  Renetta Martinez is a 27 y.o. female  at 37w1d admitted for IOL 2/2 cHTN (on medications)   - GBS negative, No indication for GBS prophylaxis   - VSS, afebrile   - cEFM/TOCO   - AROM (clear fluid)   - Continue pitocin per protocol   - Nitrous for pain control   - S/p Galvin   - S/p Cytotec 25 PV x1, 50 PO x1    - S/p Morphine/Phenergan x1    - Labetalol 100mg BID   - PreE labs wnl, P/C pending   - Continue expectant management        Attending updated and in agreement with plan    Blanca Garcia DO  Ob/Gyn Resident  2021, 9:49 AM

## 2021-07-27 NOTE — PROGRESS NOTES
Patient Name: Eliana Kim  Patient : 1995  Room/Bed: [facility]0705[facility]  Admission Date/Time: 2021  2:20 PM  MRN #: [id number]  [facility] #: [id number]    Date: 2021  Time: 9:21 AM        Eliana Kim is a 27 y.o. female  OB History    Para Term  AB Living   2 0 0 0 1 0   SAB TAB Ectopic Molar Multiple Live Births   0 1 0 0 0 0      # Outcome Date GA Lbr Valente/2nd Weight Sex Delivery Anes PTL Lv   2 Current            1 TAB                Gestational Age:  42w4d      The patient was seen and examined. The details of her pelvic exam can be found in the EPIC flow section of her EMR. Her pain  is not well controlled by nitrous. The baby is moving well. Tracing Review (Date of Tracing): There Is moderate Variability  Vitals:    21 0601 21 0659 21 0801 21 0901   BP: (!) 150/76 (!) 114/56 118/70 130/62   Pulse: 93 98 93 104   Resp: 18 18 18    Temp: 97.5 °F (36.4 °C)  99.1 °F (37.3 °C)    SpO2: 99%        FHT's are 130  The tracing is Category 1 .     Intervention:  None      Membranes Are: Ruptured clear fluid  Scalp Electrode in place: No  Intrauterine Pressure Catheter in Place: No          4 Week Lab Review:  Admission on 2021   Component Date Value Ref Range Status    Expiration Date 2021,2359   Final    Arm Band Number 2021 [id number]   Final    ABO/Rh 2021 O POSITIVE   Final    Antibody Screen 2021 NEGATIVE   Final    Amphetamine Screen, Ur 2021 NEGATIVE  NEGATIVE Final    Comment:       (Positive cutoff 1000 ng/mL)                  Barbiturate Screen, Ur 2021 NEGATIVE  NEGATIVE Final    Comment:       (Positive cutoff 200 ng/mL)                  Benzodiazepine Screen, Urine 2021 NEGATIVE  NEGATIVE Final    Comment:       (Positive cutoff 200 ng/mL)                  Cocaine Metabolite, Urine 2021 NEGATIVE  NEGATIVE Final    Comment:       (Positive cutoff 300 ng/mL)  Methadone Screen, Urine 07/26/2021 NEGATIVE  NEGATIVE Final    Comment:       (Positive cutoff 300 ng/mL)                  Opiates, Urine 07/26/2021 NEGATIVE  NEGATIVE Final    Comment:       (Positive cutoff 300 ng/mL)                  Phencyclidine, Urine 07/26/2021 NEGATIVE  NEGATIVE Final    Comment:       (Positive cutoff 25 ng/mL)                  Propoxyphene, Urine 07/26/2021 NOT REPORTED  NEGATIVE Final    Cannabinoid Scrn, Ur 07/26/2021 NEGATIVE  NEGATIVE Final    Comment:       (Positive cutoff 50 ng/mL)                  Oxycodone Screen, Ur 07/26/2021 NEGATIVE  NEGATIVE Final    Comment:       (Positive cutoff 100 ng/mL)                  Methamphetamine, Urine 07/26/2021 NOT REPORTED  NEGATIVE Final    Tricyclic Antidepressants, Urine 07/26/2021 NOT REPORTED  NEGATIVE Final    MDMA, Urine 07/26/2021 NOT REPORTED  NEGATIVE Final    Buprenorphine Urine 07/26/2021 NOT REPORTED  NEGATIVE Final    Test Information 07/26/2021 Assay provides medical screening only. The absence of expected drug(s) and/or metabolite(s) may indicate diluted or adulterated urine, limitations of testing or timing of collection. Final    Comment: Testing for legal purposes should be confirmed by another method. To request confirmation   of test result, please call the lab within 7 days of sample submission.  T. pallidum, IgG 07/26/2021 NONREACTIVE  NONREACTIVE Final    Comment:       T. pallidum antibodies are not detected. There is no serological evidence of infection with T. pallidum (early primary syphilis   cannot be excluded). Retest in 2-4 weeks if syphilis is clinically suspect.             WBC 07/26/2021 8.7  3.5 - 11.3 k/uL Final    RBC 07/26/2021 4.55  3.95 - 5.11 m/uL Final    Hemoglobin 07/26/2021 11.7* 11.9 - 15.1 g/dL Final    Hematocrit 07/26/2021 38.3  36.3 - 47.1 % Final    MCV 07/26/2021 84.2  82.6 - 102.9 fL Final    MCH 07/26/2021 25.7  25.2 - 33.5 pg Final    MCHC 07/26/2021 30.5 28.4 - 34.8 g/dL Final    RDW 07/26/2021 14.2  11.8 - 14.4 % Final    Platelets 65/74/3584 192  138 - 453 k/uL Final    MPV 07/26/2021 11.4  8.1 - 13.5 fL Final    NRBC Automated 07/26/2021 0.0  0.0 per 100 WBC Final    Differential Type 07/26/2021 NOT REPORTED   Final    Seg Neutrophils 07/26/2021 67* 36 - 65 % Final    Lymphocytes 07/26/2021 20* 24 - 43 % Final    Monocytes 07/26/2021 9  3 - 12 % Final    Eosinophils % 07/26/2021 1  1 - 4 % Final    Basophils 07/26/2021 1  0 - 2 % Final    Immature Granulocytes 07/26/2021 2* 0 % Final    Segs Absolute 07/26/2021 5.87  1.50 - 8.10 k/uL Final    Absolute Lymph # 07/26/2021 1.74  1.10 - 3.70 k/uL Final    Absolute Mono # 07/26/2021 0.77  0.10 - 1.20 k/uL Final    Absolute Eos # 07/26/2021 0.10  0.00 - 0.44 k/uL Final    Basophils Absolute 07/26/2021 0.04  0.00 - 0.20 k/uL Final    Absolute Immature Granulocyte 07/26/2021 0.14  0.00 - 0.30 k/uL Final    WBC Morphology 07/26/2021 NOT REPORTED   Final    RBC Morphology 07/26/2021 NOT REPORTED   Final    Platelet Estimate 69/98/6801 NOT REPORTED   Final    Specimen Description 07/26/2021 . NASOPHARYNGEAL SWAB   Final    SARS-CoV-2, Rapid 07/26/2021 Not Detected  Not Detected Final    Comment:       Rapid NAAT:  The specimen is NEGATIVE for SARS-CoV-2, the novel coronavirus associated with   COVID-19. The ID NOW COVID-19 assay is designed to detect the virus that causes COVID-19 in patients   with signs and symptoms of infection who are suspected of COVID-19. An individual without symptoms of COVID-19 and who is not shedding SARS-CoV-2 virus would   expect to have a negative (not detected) result in this assay. Negative results should be treated as presumptive and, if inconsistent with clinical signs   and symptoms or necessary for patient management,  should be tested with an alternative molecular assay.  Negative results do not preclude   SARS-CoV-2 infection and   should not be used as the sole basis for patient management decisions. Fact sheet for Healthcare Providers: BuildHer.es  Fact sheet for Patients: BuildHer.es          Methodology: Isothermal Nucleic Acid Amplification      Glucose 07/26/2021 111* 70 - 99 mg/dL Final    BUN 07/26/2021 7  6 - 20 mg/dL Final    CREATININE 07/26/2021 <0.40* 0.50 - 0.90 mg/dL Final    Bun/Cre Ratio 07/26/2021 NOT REPORTED  9 - 20 Final    Calcium 07/26/2021 9.6  8.6 - 10.4 mg/dL Final    Sodium 07/26/2021 139  135 - 144 mmol/L Final    Potassium 07/26/2021 3.9  3.7 - 5.3 mmol/L Final    Chloride 07/26/2021 104  98 - 107 mmol/L Final    CO2 07/26/2021 22  20 - 31 mmol/L Final    Anion Gap 07/26/2021 13  9 - 17 mmol/L Final    Alkaline Phosphatase 07/26/2021 151* 35 - 104 U/L Final    ALT 07/26/2021 16  5 - 33 U/L Final    AST 07/26/2021 19  <32 U/L Final    Total Bilirubin 07/26/2021 0.16* 0.3 - 1.2 mg/dL Final    Total Protein 07/26/2021 6.8  6.4 - 8.3 g/dL Final    Albumin 07/26/2021 3.8  3.5 - 5.2 g/dL Final    Albumin/Globulin Ratio 07/26/2021 NOT REPORTED  1.0 - 2.5 Final    GFR Non- 07/26/2021 CANNOT BE CALCULATED  >60 mL/min Final    GFR  07/26/2021 CANNOT BE CALCULATED  >60 mL/min Final    GFR Comment 07/26/2021        Final    Comment: Average GFR for 20-28 years old:   80 mL/min/1.73sq m  Chronic Kidney Disease:   <60 mL/min/1.73sq m  Kidney failure:   <15 mL/min/1.73sq m              eGFR calculated using average adult body mass. Additional eGFR calculator available at:        [employer].br            GFR Staging 07/26/2021 NOT REPORTED   Final    TSH 07/26/2021 0.09* 0.30 - 5.00 mIU/L Final    Thyroxine, Free 07/26/2021 1.07  0.93 - 1.70 ng/dL Final   Hospital Outpatient Visit on 07/21/2021   Component Date Value Ref Range Status    Specimen Description 07/21/2021 . VAGINAL/PERIRECTAL   Final    Special Requests 2021 NOT REPORTED   Final    Direct Exam 2021 NEGATIVE:  GROUP B STREPTOCOCCAL DNA NOT DETECTED BY NUCLEIC ACID AMPLIFICATION. This test detects GBS DNA in vaginal/rectal specimens to identify colonization. A positive result will not distinguish colonization from infection. Final   ]    /62   Pulse 104   Temp 99.1 °F (37.3 °C)   Resp 18   LMP 11/15/2020 (Exact Date)   SpO2 99%       Pelvic Exam:  Cervix Check:     DILATION:  4-5 cm   EFFACEMENT:   70%   STATION:  0 cm   CONSISTENCY:  medium   POSITION:  mid    FETAL POSITION: Cephalic    Assessment:  1Willie Rios is a 27 y.o. female  37w1d     2.   OB History    Para Term  AB Living   2       1     SAB TAB Ectopic Molar Multiple Live Births     1              # Outcome Date GA Lbr Valente/2nd Weight Sex Delivery Anes PTL Lv   2 Current            1 TAB                  3. 37 weeks gestation of pregnancy [Z3A.37]      4. Patient Active Problem List    Diagnosis Date Noted    37 weeks gestation of pregnancy 2021    Hypertension affecting pregnancy in third trimester 2021    Hypothyroid in pregnancy, antepartum, third trimester 2021    Abnormal quad screen 03/10/2021       5. IOL d/t chronic HTN on meds. BPs stable          Plan:   1. Continue with current care plan  2. Pitocin per protocol  3.  Epidural when needed        Electronically signed by Tila Garcia DO on 2021 at 9:21 AM

## 2021-07-27 NOTE — PROGRESS NOTES
Labor Progress Note    Brian Reyes is a 27 y.o. female  at 42w4d  The patient was seen and examined. Her pain is well controlled, epidural in place. She reports fetal movement is present, complains of contractions, complains of loss of fluid, denies vaginal bleeding.        Vital Signs:  Vitals:    21 1056 21 1101 21 1129 21 1131   BP: 119/61 114/60 125/64 (!) 106/37   Pulse: 97 109 97 99   Resp:    16   Temp:    98 °F (36.7 °C)   SpO2:             FHT: 155, moderate variability, accelerations present, decelerations absent  Contractions: regular, every 2-3 minutes    Chaperone for Intimate Exam: Chaperone was present for entire exam, Chaperone Name: Sarahi Stone RN  Cervical Exam: 6 cm dilated, 70 effaced, 0 station  Pitocin: @ 12 mu/min    Membranes: Ruptured clear fluid  Scalp Electrode in place: absent  Intrauterine Pressure Catheter in Place: absent    Interventions: none    Assessment/Plan:  Brian Reyes is a 27 y.o. female  at 37w1d admitted for IOL 2/2 cHTN (on medications)   - GBS negative, No indication for GBS prophylaxis   - VSS, afebrile   - cEFM/TOCO   - s/p AROM (clear fluid)   - Continue pitocin per protocol   - Epidural in place   - S/p Galvin   - S/p Cytotec 25 PV x1, 50 PO x1    - S/p Morphine/Phenergan x1    - Labetalol 100mg BID   - PreE labs wnl, P/C pending   - Continue expectant management        Attending updated and in agreement with plan    Eder Urias DO  Ob/Gyn Resident  2021, 11:43 AM

## 2021-07-27 NOTE — PROGRESS NOTES
OBGYN Labor Progress Note    Elías Gonzalez is a 27 y.o. female  at 42w4d  The patient was seen and examined. Her pain is well controlled with nitrous. Her huerta balloon fell out. She reports fetal movement is present, complains of contractions, denies loss of fluid, denies vaginal bleeding. Pt is agreeable to cervical check.     Vital Signs:  Vitals:    21 1821 21 2129 21 2353 21 0458   BP: 134/65 (!) 141/75 133/78 121/64   Pulse:  89 80 91   Resp:  16 16 16   Temp:  98.1 °F (36.7 °C) 98.5 °F (36.9 °C) 98.1 °F (36.7 °C)       FHT: 145, moderate variability, accelerations present, decelerations absent  Contractions: irritability    Examination chaperoned by Anjel HOOPER    Cervical Exam: 4 cm dilated, 50% effaced, -2 station  Pitocin: @ 0 mu/min    Membranes: Intact  Scalp Electrode in place: absent  Intrauterine Pressure Catheter in Place: absent    Interventions: none    Assessment/Plan:  Elías Gonzalez is a 27 y.o. female  at 37w1d IUP   - GBS negative / Rh positive / R immune   - No indication for GBS prophylaxis   - COVID19 neg ()    IOL 2/2 cHTN (meds)   - VSS   - BPs normotensive/elevated but not in severe range   - cEFM and TOCO   - Labetalol 100mg BID   - IVF:  mL/hr   - S/p Morphine/Phenergan x1   - S/p Cytotec 25 PV x1, 50 PO x1    - S/p Huerta balloon    - Nitrous for further pain control    - Plan to start pitocin    - Continue expectant management    Attending updated     Eliezer Gitelman, DO  OBGYN Resident  2021, 5:16 AM     Senior Residents Attestation Statement  I was present with the resident physician during the history and exam. I discussed the findings and plans with the resident physician and agree as documented in her note     Octavio Worrell DO   OB/GYN Resident  Pager [phone] [facility]  2021, 6:48 AM

## 2021-07-27 NOTE — PROGRESS NOTES
OBGYN Labor Progress Note    Leni Santana is a 27 y.o. female  at 37w0d  The patient was seen and examined. Her pain is well controlled. She reports fetal movement is present, complains of contractions, denies loss of fluid, denies vaginal bleeding. Pt is agreeable to cervical check and placement of Huerta balloon. Huerta balloon placed and patient tolerated the procedure well.     Vital Signs:  Vitals:    21 1624 21 1730 21 1821 21 2129   BP: (!) 154/94 135/76 134/65 (!) 141/75   Pulse: 107 96  89   Resp: 16 18  16   Temp: 98.1 °F (36.7 °C)   98.1 °F (36.7 °C)       FHT: 140, moderate variability, accelerations present, occasional variable decelerations with spontaneous return to baseline  Contractions: irregular, every 6-7 minutes    Examination chaperoned by Anjel HOOPER    Cervical Exam: 1 cm dilated, 30 effaced, -3 station  Pitocin: @ 0 mu/min    Membranes: Intact  Scalp Electrode in place: absent  Intrauterine Pressure Catheter in Place: absent    Interventions: huerta balloon placed    Assessment/Plan:  Leni Santana is a 27 y.o. female  at 37w0d IUP   - GBS negative / Rh positive / R immune   - No indication for GBS prophylaxis   - COVID19 neg     IOL 2/2 cHTN (meds)   - VSS   - Labetalol 100mg BID   - cEFM and TOCO   - S/p Cytotec 25mcg PV x1   - Huerta balloon in place, out at  1020 if not already displaced   - Will redose Cytotec 50mcg PO x1   - Continue expectant management    Attending updated and in agreement with plan    Diandra Edwards DO  OBGYN Resident  2021, 10:21 PM

## 2021-07-27 NOTE — ANESTHESIA PRE PROCEDURE
Department of Anesthesiology  Preprocedure Note       Name:  Brian Reyes   Age:  27 y. o.  :  1995                                          MRN:  [id number]         Date:  2021      Surgeon: * No surgeons listed *    Procedure: * No procedures listed *    Medications prior to admission:   Prior to Admission medications    Medication Sig Start Date End Date Taking?  Authorizing Provider   aspirin EC 81 MG EC tablet Take 1 tablet by mouth daily  Patient not taking: Reported on 2021   TWAN Moon CNP   Prenatal Vit-Fe Fumarate-FA (PNV PRENATAL PLUS MULTIVITAMIN) 27-1 MG TABS Take 1 tablet by mouth daily 21  TWAN Moon CNP   labetalol (NORMODYNE) 100 MG tablet Take 1 tablet by mouth 2 times daily 21  Gracie Chadwick MD   butalbital-acetaminophen-caffeine (FIORICET, ESGIC) -80 MG per tablet Take 1 tablet by mouth every 6 hours as needed for Headaches 21   Gracie Chadwick MD   aspirin 81 MG chewable tablet Take 81 mg by mouth daily 3/19/21 10/25/21  Historical Provider, MD       Current medications:    Current Facility-Administered Medications   Medication Dose Route Frequency Provider Last Rate Last Admin    nitrous oxide 50% inhalation 1 each  1 each Inhalation Continuous PRN Kin Wu DO        oxytocin (PITOCIN) 30 units in 500 mL infusion  1-20 adriane-units/min Intravenous Continuous Louisa Manzanares DO 10 mL/hr at 21 0900 10 adriane-units/min at 21 0900    naloxone (NARCAN) injection 0.4 mg  0.4 mg Intravenous PRN Lio Escobar MD        nalbuphine (NUBAIN) injection 5 mg  5 mg Intravenous Q4H PRN Lio Escobar MD        ondansetron [facility]) injection 4 mg  4 mg Intravenous Q6H PRN Lio Escobar MD        ropivacaine 0.2% in sodium chloride 0.9% (OB) epidural 100 mL  10 mL/hr Epidural Continuous Lio Escobar MD 10 mL/hr at 21 1056 10 mL/hr at 21 1056    lactated ringers infusion Tobacco Use    Smoking status: Never Smoker    Smokeless tobacco: Never Used   Substance Use Topics    Alcohol use: Never                                Counseling given: Not Answered      Vital Signs (Current):   Vitals:    07/27/21 1051 07/27/21 1055 07/27/21 1056 07/27/21 1101   BP: 128/68  119/61 114/60   Pulse: 93  97 109   Resp:       Temp:       SpO2:  98%                                                BP Readings from Last 3 Encounters:   07/27/21 114/60   07/21/21 130/82   07/14/21 133/80       NPO Status:                                                                                 BMI:   Wt Readings from Last 3 Encounters:   07/21/21 212 lb (96.2 kg)   07/14/21 210 lb (95.3 kg)   06/28/21 211 lb 3.2 oz (95.8 kg)     There is no height or weight on file to calculate BMI.    CBC:   Lab Results   Component Value Date    WBC 8.7 07/26/2021    RBC 4.55 07/26/2021    HGB 11.7 07/26/2021    HCT 38.3 07/26/2021    MCV 84.2 07/26/2021    RDW 14.2 07/26/2021     07/26/2021       CMP:   Lab Results   Component Value Date     07/26/2021    K 3.9 07/26/2021     07/26/2021    CO2 22 07/26/2021    BUN 7 07/26/2021    CREATININE <0.40 07/26/2021    GFRAA CANNOT BE CALCULATED 07/26/2021    LABGLOM CANNOT BE CALCULATED 07/26/2021    GLUCOSE 111 07/26/2021    PROT 6.8 07/26/2021    CALCIUM 9.6 07/26/2021    BILITOT 0.16 07/26/2021    ALKPHOS 151 07/26/2021    AST 19 07/26/2021    ALT 16 07/26/2021       POC Tests: No results for input(s): POCGLU, POCNA, POCK, POCCL, POCBUN, POCHEMO, POCHCT in the last 72 hours.     Coags: No results found for: PROTIME, INR, APTT    HCG (If Applicable):   Lab Results   Component Value Date    PREGTESTUR positive 01/18/2021        ABGs: No results found for: PHART, PO2ART, GHD8ZQJ, LSI4VQI, BEART, H7TPIHQY     Type & Screen (If Applicable):  No results found for: LABABO, LABRH    Drug/Infectious Status (If Applicable):  No results found for: HIV, HEPCAB    COVID-19 Screening (If Applicable):   Lab Results   Component Value Date    COVID19 Not Detected 07/26/2021           Anesthesia Evaluation    Airway: Mallampati: Unable to assess / NA        Dental:          Pulmonary:Negative Pulmonary ROS                              Cardiovascular:    (+) hypertension:,     (-) past MI and  angina                Neuro/Psych:   Negative Neuro/Psych ROS              GI/Hepatic/Renal: Neg GI/Hepatic/Renal ROS  (+) morbid obesity          Endo/Other:    (+) hypothyroidism::., .                 Abdominal:             Vascular: negative vascular ROS. Other Findings:             Anesthesia Plan      epidural     ASA 3             Anesthetic plan and risks discussed with patient.         Attending anesthesiologist reviewed and agrees with Preprocedure content              TWAN Dias - EDILMA   7/27/2021

## 2021-07-27 NOTE — FLOWSHEET NOTE
Patient admitted to room 735 from L&D via bed. Oriented to room and surroundings. Plan of care reviewed. Verbalized understanding. Preventing falls education provided . The following handouts given: A New Beginning: Your Guide to Postpartum Care, Rounding, Hugs Security System,Babies Cry A lot, Safe Sleep, Security and Visitation Guidelines. Call light placed within reach. Instructions given through .

## 2021-07-27 NOTE — PROGRESS NOTES
Ob/Gyn Interval Note     Patient was seen and evaluated. FHT noted to be tachy 170-200s bpm. IVF bolus given and patent repositioned. SVE performed and patient still 6 cm. IUPC placed at this time without difficulty. Patient temperature also noted to be rising and currently is 99F. Attending entered room and performed repeat SVE. Patient still 6 cm. Due to persistent fetal tachycardia non responsive to uterine resuscitation decision was made to proceed with C/S. Pitocin was turned off, C/S consent obtained, Anesthesia and NICU notified. The patient then had a fever of 101.4 and with fetal tachycardia met criteria for Chorioamnionitis. Therefore she received Amp/Gent/Clinda preoperatively.      Vitals:    07/27/21 1315 07/27/21 1331 07/27/21 1344 07/27/21 1401   BP:  (!) 141/67  134/81   Pulse:  131  111   Resp:       Temp: 99.7 °F (37.6 °C)  100 °F (37.8 °C) 101.4 °F (38.6 °C)   SpO2: 97%      Weight:           Aidan Silver  OB/GYN Resident  [facility]  7/27/2021 2:21 PM

## 2021-07-27 NOTE — ANESTHESIA PROCEDURE NOTES
Spinal Block    Patient location during procedure: OB  Reason for block: primary anesthetic  Staffing  Performed: resident/CRNA   Resident/CRNA: TWAN Middleton CRNA  Preanesthetic Checklist  Completed: patient identified, IV checked, risks and benefits discussed, surgical consent, monitors and equipment checked, pre-op evaluation, timeout performed, anesthesia consent given, oxygen available and patient being monitored  Spinal Block  Patient position: sitting  Prep: Betadine  Patient monitoring: continuous pulse ox and frequent blood pressure checks  Approach: midline  Location: L3/L4  Provider prep: sterile gloves and sterile gown  Local infiltration: lidocaine  Dose: 0.2  Agent: bupivacaine  Adjuvant: duramorph  Dose: 1.8  Dose: 1.8  Needle  Needle type: Pencan   Needle gauge: 24 G  Needle length: 4 in  Needle insertion depth: 8 cm  Assessment  Sensory level: see quick note.   Swirl obtained: Yes  CSF: clear  Attempts: 1  Hemodynamics: stable

## 2021-07-27 NOTE — OP NOTE
[facility]  OBSTETRICAL  PHYSICIAN POST-OPERATIVE  NOTE:      Patient Name: Rachna Ahn  Patient : 1995  [facility]  Admission Date/Time: 2021  2:20 PM  Primary Care Physician: No primary care provider on file. MRN #: [id number]  CSN #: [id number]        Date: 2021  Time: 3:19 PM        Pre-operative Diagnosis:   Rachna Ahn is a 27 y.o. female at 42w4d      Term pregnancy, Induced labor, Single fetus and fetal tachycardia  Patient Active Problem List    Diagnosis Date Noted    37 weeks gestation of pregnancy 2021    Hypertension affecting pregnancy in third trimester 2021    Hypothyroid in pregnancy, antepartum, third trimester 2021    Abnormal quad screen 03/10/2021       1. IUP@ 37w1d  2. See Problem List  3. Category 2 FHR tracing unresponsive to intrauterine resuscitative measures  4. Fetal tachycardia with maternal temp  5. Remote from delivery      Post-operative Diagnosis:    Living  infant(s) and Male  Same as Pre-Op        Procedures:  1.  Section- primary : Low Cervical, Transverse    2.  Abdominal Delivery of a Live Born     male        Surgeon:  Boubacar Bill DO      Assistants:  Tutu Gar D.O. PGY3       OR Staff:  Scrub Person First: Davion Hsu RN      Anesthesia:  epidural, general, spinal    Duramorph Utilized: Yes        Estimated blood loss:  See QBL ML    Fluids:     IV: 1000 ml   Blood Products:  none   Cell Saver: No    Urine Output[de-identified]  800 ml (clear)    Drains:   TYPE: Galvin  * No LDAs found *      TRS Tissue Retention System Skin Retractor Utilized and placed under sterile conditions: No      Complications:  None      Findings/ Delivery Summary:  Mother's Information    Labor Events     labor?: No  Rupture type: Artificial=AROM     Mother Delivery Information    Surgical or Additional Est. Blood Loss (mL): 0 (View Only): Edit in Antuit Rate:        Reflex Irritability:        Muscle Tone:        Respiratory Effort: Total:                Apgars Assigned By: NICU     [place] Measurements       Delivery Information    Surgical or additional est. blood loss (mL): 0 (View Only): Edit in Flowsheets   Combined est. blood loss (mL): 0     Other Procedures    Procedures: None              Living  infant(s) and Male    Cephalic  right occiput posterior  Other:       Amniotic Fluid was: Clear  A Nuchal Cord: was not present x 0  A Spontaneous Cry Was Noted: Yes  The Baby: was suctioned        The Placenta Was Removed:  intact, whole and that the umbilical cord had three vessels noted    cord gasses were obtained and sent to the lab, cord blood was obtained and sent to the lab and Pitocin, 20 milliunits in 1 liter of ringers lactate was administered, wide open, to assist with uterine contraction    The umbilical cord had delayed clamping of 1 minute: No general    The Maternal Adnexa was Visualized. There were not any adnexal masses. Body mass index is 35.28 kg/m². (Wound Vac indicated for BMI Value>35)    Wound Vac Applied to Incision: Yes      Specimen:  Placenta sent to pathology yes. Cultures sent of uterus  * No specimens in log *     Condition:  infant stable to general nursery and mother stable    Blood Type and Rh: O POSITIVE        Rubella Immunity Status:    Rubella Antibody, IGG   Date Value Ref Range Status   2021 49.4 IU/mL Final     Comment:                 REFERENCE RANGE:  <5.0       NON-REACTIVE (non-immune)  5.0 TO 9.9 EQUIVOCAL  >=10.0     REACTIVE     (immune)                 Infant Feeding:    breast      All Sponge Counts Were Correct x 3 calls-Prior to closure of Peritoneum, Fascia, and Skin Layers: Yes        Attending Attestation: I was present and scrubbed for the entire procedure.     SCIP will be utilized for Antibiotics: amp/ gent / clinda  Allergies as of 2021    (No Known Allergies) EPC's in  Place for DVT prophylaxis      Procedure: (Understanding of limitations from template op-reports exist)  Octavio Andrade is a 27 y.o. female  @ 37w1d for  delivery. The risks, benefits, complications, alternative treatment options, and expected outcomes were discussed with the patient. Risks of surgery were discussed, including but not limited to: pain, bleeding, need for blood transfusion, infection, injury to internal organs including intestines, bladder, uterus, fallopian tubes, ovaries and rarely injury to the fetus. Postoperative complications including pain, bleeding, need for blood transfusion, infection, re-operation, infection of the incisions were also explained. The patient verbalized understanding and agreed to proceed, giving informed consent. The patient was taken to Operating Room, identified as Octavio Andrade and the procedure verified as  Delivery. A Time Out was held and the above information confirmed. Procedure Details:  After Spinal Anesthetic with Duramorph was instilled in the seated position the patient was returned to the supine position with a wedge placed under her right hip. FHT's were obtained, the patient was prepped and draped in the usual sterile manner. A three minute drape delay was completed. The bladder was draining clear urine. SCIP antibiotics were infused, EPC's were in place and operating. A time out was completed. There was an adequate skin check both high and low. A Pfannenstiel incision was made with a #10 scalpel and carried down to the fascia with bovie cautery. Fascial incision was made and extended transversely. The fascia was  from the underlying rectus tissue superiorly and inferiorly. The peritoneum was identified and entered superiorly. The peritoneal incision was extended superiorly and inferiorly, care not to involve the bowel or the bladder.   The [place]ena FAROOQ retractor was placed inferiorly into the incision. The vesico-uterine reflection was developed and skeletonized off the lower uterine segment with blunt and sharp dissection. The [employer] retractor was reapproximated. A low transverse uterine incision was made and the uterine cavity was entered with extreme caution with the blunt end of the scalpel. This incision was extended using digital dissection in a cephalad to caudad manner. A live female infant was delivered without complications. The head was delivered through the incision and fundal pressure was used for the remaining body of the . There was not a nuchal cord. The  had bulb suction of the mouth and nares. There was a spontaneous cry. The infant was vigorous  and there was not delayed cord clamping of 1 minute. Please find the  Apgar scores and weight above in the delivery summary. The umbilical cord was then clamped and cut, the infant was handed off to the awaiting neonatology team staff member attending the delivery. Then cord specimen and cord blood was collected and the placenta was delivered using gentle traction and fundal massage, (Spontaneously), it was intact and appeared normal.  The uterus was cleared of all clots and debris and was firm and contracted. IV Pitocin was infusing. An outflow tract was confirmed. The uterine incision was closed with running locked sutures of 0 Vicryl, starting at each corner with figure of \"8's\" and moving to the midline. Excellent hemostasis was observed. Gutters were cleared of all clots and debris. The pelvis was irrigated with warm, sterile water. Uterine incision was reinspected and hemostatic. The uterus, bilateral tubes and ovaries were normal.   The peritoneum was closed with 2-0 vicryl. The fascia was then reapproximated with running sutures of 0 Vicryl, starting at each corner and moving to the midline. It was checked for any defects and there were none.  The subcutaneous tissue was irrigated, any bleeding sites

## 2021-07-27 NOTE — PROGRESS NOTES
Patient Name: Eliana Kim  Patient : 1995  Room/Bed: [address] [place], [place] [address]  Admission Date/Time: 2021  2:20 PM  MRN #: [id number]  [facility] #: [id number]    Date: 2021  Time: 2:09 PM        Eliana Kim is a 27 y.o. female  OB History    Para Term  AB Living   2 0 0 0 1 0   SAB TAB Ectopic Molar Multiple Live Births   0 1 0 0 0 0      # Outcome Date GA Lbr Valente/2nd Weight Sex Delivery Anes PTL Lv   2 Current            1 TAB                Gestational Age:  42w4d      The patient was seen and examined. The details of her pelvic exam can be found in the EPIC flow section of her EMR. Her pain  is well controlled by epidural. Pt feeling pressure. The baby is moving well. Tracing Review (Date of Tracing): There Is moderate Variability  Vitals:    21 1315 21 1331 21 1344 21 1401   BP:  (!) 141/67  134/81   Pulse:  131  111   Resp:       Temp: 99.7 °F (37.6 °C)  100 °F (37.8 °C) 101.4 °F (38.6 °C)   SpO2: 97%        FHT's are 170-180s  The tracing is Category 2 . Unresponsive to intrauterine resuscitative measures.     Intervention:  positional changes/ pitocin off/ IV fluid bolus/ O2      Membranes Are: Ruptured clear fluid  Scalp Electrode in place: No  Intrauterine Pressure Catheter in Place: No        4 Week Lab Review:  Admission on 2021   Component Date Value Ref Range Status    Expiration Date 2021,2359   Final    Arm Band Number 2021 [id number]   Final    ABO/Rh 2021 O POSITIVE   Final    Antibody Screen 2021 NEGATIVE   Final    Amphetamine Screen, Ur 2021 NEGATIVE  NEGATIVE Final    Comment:       (Positive cutoff 1000 ng/mL)                  Barbiturate Screen, Ur 2021 NEGATIVE  NEGATIVE Final    Comment:       (Positive cutoff 200 ng/mL)                  Benzodiazepine Screen, Urine 2021 NEGATIVE  NEGATIVE Final    Comment:       (Positive cutoff 200 ng/mL)                  Cocaine Metabolite, Urine 07/26/2021 NEGATIVE  NEGATIVE Final    Comment:       (Positive cutoff 300 ng/mL)                  Methadone Screen, Urine 07/26/2021 NEGATIVE  NEGATIVE Final    Comment:       (Positive cutoff 300 ng/mL)                  Opiates, Urine 07/26/2021 NEGATIVE  NEGATIVE Final    Comment:       (Positive cutoff 300 ng/mL)                  Phencyclidine, Urine 07/26/2021 NEGATIVE  NEGATIVE Final    Comment:       (Positive cutoff 25 ng/mL)                  Propoxyphene, Urine 07/26/2021 NOT REPORTED  NEGATIVE Final    Cannabinoid Scrn, Ur 07/26/2021 NEGATIVE  NEGATIVE Final    Comment:       (Positive cutoff 50 ng/mL)                  Oxycodone Screen, Ur 07/26/2021 NEGATIVE  NEGATIVE Final    Comment:       (Positive cutoff 100 ng/mL)                  Methamphetamine, Urine 07/26/2021 NOT REPORTED  NEGATIVE Final    Tricyclic Antidepressants, Urine 07/26/2021 NOT REPORTED  NEGATIVE Final    MDMA, Urine 07/26/2021 NOT REPORTED  NEGATIVE Final    Buprenorphine Urine 07/26/2021 NOT REPORTED  NEGATIVE Final    Test Information 07/26/2021 Assay provides medical screening only. The absence of expected drug(s) and/or metabolite(s) may indicate diluted or adulterated urine, limitations of testing or timing of collection. Final    Comment: Testing for legal purposes should be confirmed by another method. To request confirmation   of test result, please call the lab within 7 days of sample submission.  T. pallidum, IgG 07/26/2021 NONREACTIVE  NONREACTIVE Final    Comment:       T. pallidum antibodies are not detected. There is no serological evidence of infection with T. pallidum (early primary syphilis   cannot be excluded). Retest in 2-4 weeks if syphilis is clinically suspect.             WBC 07/26/2021 8.7  3.5 - 11.3 k/uL Final    RBC 07/26/2021 4.55  3.95 - 5.11 m/uL Final    Hemoglobin 07/26/2021 11.7* 11.9 - 15.1 g/dL Final    Hematocrit 07/26/2021 38.3  36.3 - 47.1 % Final    MCV 07/26/2021 84.2  82.6 - 102.9 fL Final    MCH 07/26/2021 25.7  25.2 - 33.5 pg Final    MCHC 07/26/2021 30.5  28.4 - 34.8 g/dL Final    RDW 07/26/2021 14.2  11.8 - 14.4 % Final    Platelets 37/94/4638 192  138 - 453 k/uL Final    MPV 07/26/2021 11.4  8.1 - 13.5 fL Final    NRBC Automated 07/26/2021 0.0  0.0 per 100 WBC Final    Differential Type 07/26/2021 NOT REPORTED   Final    Seg Neutrophils 07/26/2021 67* 36 - 65 % Final    Lymphocytes 07/26/2021 20* 24 - 43 % Final    Monocytes 07/26/2021 9  3 - 12 % Final    Eosinophils % 07/26/2021 1  1 - 4 % Final    Basophils 07/26/2021 1  0 - 2 % Final    Immature Granulocytes 07/26/2021 2* 0 % Final    Segs Absolute 07/26/2021 5.87  1.50 - 8.10 k/uL Final    Absolute Lymph # 07/26/2021 1.74  1.10 - 3.70 k/uL Final    Absolute Mono # 07/26/2021 0.77  0.10 - 1.20 k/uL Final    Absolute Eos # 07/26/2021 0.10  0.00 - 0.44 k/uL Final    Basophils Absolute 07/26/2021 0.04  0.00 - 0.20 k/uL Final    Absolute Immature Granulocyte 07/26/2021 0.14  0.00 - 0.30 k/uL Final    WBC Morphology 07/26/2021 NOT REPORTED   Final    RBC Morphology 07/26/2021 NOT REPORTED   Final    Platelet Estimate 49/27/2880 NOT REPORTED   Final    Specimen Description 07/26/2021 . NASOPHARYNGEAL SWAB   Final    SARS-CoV-2, Rapid 07/26/2021 Not Detected  Not Detected Final    Comment:       Rapid NAAT:  The specimen is NEGATIVE for SARS-CoV-2, the novel coronavirus associated with   COVID-19. The ID NOW COVID-19 assay is designed to detect the virus that causes COVID-19 in patients   with signs and symptoms of infection who are suspected of COVID-19. An individual without symptoms of COVID-19 and who is not shedding SARS-CoV-2 virus would   expect to have a negative (not detected) result in this assay.   Negative results should be treated as presumptive and, if inconsistent with clinical signs   and symptoms or necessary for patient management,  should be tested with an alternative molecular assay. Negative results do not preclude   SARS-CoV-2 infection and   should not be used as the sole basis for patient management decisions. Fact sheet for Healthcare Providers: Gisele  Fact sheet for Patients: Anastasia.es          Methodology: Isothermal Nucleic Acid Amplification      Glucose 07/26/2021 111* 70 - 99 mg/dL Final    BUN 07/26/2021 7  6 - 20 mg/dL Final    CREATININE 07/26/2021 <0.40* 0.50 - 0.90 mg/dL Final    Bun/Cre Ratio 07/26/2021 NOT REPORTED  9 - 20 Final    Calcium 07/26/2021 9.6  8.6 - 10.4 mg/dL Final    Sodium 07/26/2021 139  135 - 144 mmol/L Final    Potassium 07/26/2021 3.9  3.7 - 5.3 mmol/L Final    Chloride 07/26/2021 104  98 - 107 mmol/L Final    CO2 07/26/2021 22  20 - 31 mmol/L Final    Anion Gap 07/26/2021 13  9 - 17 mmol/L Final    Alkaline Phosphatase 07/26/2021 151* 35 - 104 U/L Final    ALT 07/26/2021 16  5 - 33 U/L Final    AST 07/26/2021 19  <32 U/L Final    Total Bilirubin 07/26/2021 0.16* 0.3 - 1.2 mg/dL Final    Total Protein 07/26/2021 6.8  6.4 - 8.3 g/dL Final    Albumin 07/26/2021 3.8  3.5 - 5.2 g/dL Final    Albumin/Globulin Ratio 07/26/2021 NOT REPORTED  1.0 - 2.5 Final    GFR Non- 07/26/2021 CANNOT BE CALCULATED  >60 mL/min Final    GFR  07/26/2021 CANNOT BE CALCULATED  >60 mL/min Final    GFR Comment 07/26/2021        Final    Comment: Average GFR for 20-28 years old:   80 mL/min/1.73sq m  Chronic Kidney Disease:   <60 mL/min/1.73sq m  Kidney failure:   <15 mL/min/1.73sq m              eGFR calculated using average adult body mass.  Additional eGFR calculator available at:        [email or web address]            GFR Staging 07/26/2021 NOT REPORTED   Final    TSH 07/26/2021 0.09* 0.30 - 5.00 mIU/L Final    Thyroxine, Free 07/26/2021 1.07  0.93 - 1.70 ng/dL Final   Hospital Outpatient Visit on 2021   Component Date Value Ref Range Status    Specimen Description 2021 . VAGINAL/PERIRECTAL   Final    Special Requests 2021 NOT REPORTED   Final    Direct Exam 2021 NEGATIVE:  GROUP B STREPTOCOCCAL DNA NOT DETECTED BY NUCLEIC ACID AMPLIFICATION. This test detects GBS DNA in vaginal/rectal specimens to identify colonization. A positive result will not distinguish colonization from infection. Final   ]    /81   Pulse 111   Temp 101.4 °F (38.6 °C)   Resp 16   LMP 11/15/2020 (Exact Date)   SpO2 97%       Pelvic Exam:  Cervix Check:     DILATION:  5-6 cm   EFFACEMENT:   70%   STATION:  0 cm   CONSISTENCY:  soft   POSITION:  mid    FETAL POSITION: Cephalic, posterior    Assessment:  1. Drake Diaz is a 27 y.o. female  37w1d     2.   OB History    Para Term  AB Living   2       1     SAB TAB Ectopic Molar Multiple Live Births     1              # Outcome Date GA Lbr Valente/2nd Weight Sex Delivery Anes PTL Lv   2 Current            1 TAB                  3. 37 weeks gestation of pregnancy [Z3A.37]      4. Patient Active Problem List    Diagnosis Date Noted    37 weeks gestation of pregnancy 2021    Hypertension affecting pregnancy in third trimester 2021    Hypothyroid in pregnancy, antepartum, third trimester 2021    Abnormal quad screen 03/10/2021       5. IOL d/t chronic HTN on meds. 6. Fetal tachycardia unresponsive to intrauterine resuscitative measures. Suspected chorioamnionitis          Plan:   1. Continue with current care plan  2. Primary . NICU/ anesthesia notified.  Pt consented via          Electronically signed by Ulices Chavis DO on 2021 at 2:09 PM

## 2021-07-27 NOTE — FLOWSHEET NOTE
Writer to room to tug on huerta balloon. Pt states \"No too painful\" at this time. Will continue to monitor.

## 2021-07-27 NOTE — FLOWSHEET NOTE
Pt educated on the use of self-administered  nitrous oxide for pain control and side effects. Pt educated on when and how to use the mask appropriately. Pt educated that she is the only person allowed to hold the mask to her face and that no one should prop the mask against her face. Pt also educated that she is the only person in the room allowed to use the mask. Pt informed that she cannot be out of bed without a trained support person with her. Pt completed a return demonstration of the use of nitrous oxide and all questions and concerns were addressed. RN verified the presence of a signed agreement form for the nitrous oxide. Pre-intervention VS, assessment, and FHT'st as charted. Nitrous oxide and oxygen at a 50-50 mix was initiated at 0110. RN remains at bedside for the first 15 mins post initiation. Pt has experienced no side effects at this time.

## 2021-07-28 LAB
ABSOLUTE EOS #: 0.06 K/UL (ref 0–0.44)
ABSOLUTE IMMATURE GRANULOCYTE: 0.22 K/UL (ref 0–0.3)
ABSOLUTE LYMPH #: 2.13 K/UL (ref 1.1–3.7)
ABSOLUTE MONO #: 1.48 K/UL (ref 0.1–1.2)
BASOPHILS # BLD: 0 % (ref 0–2)
BASOPHILS ABSOLUTE: 0.04 K/UL (ref 0–0.2)
DIFFERENTIAL TYPE: ABNORMAL
EOSINOPHILS RELATIVE PERCENT: 0 % (ref 1–4)
HCT VFR BLD CALC: 29.4 % (ref 36.3–47.1)
HEMOGLOBIN: 9.1 G/DL (ref 11.9–15.1)
IMMATURE GRANULOCYTES: 1 %
LYMPHOCYTES # BLD: 12 % (ref 24–43)
MCH RBC QN AUTO: 26.9 PG (ref 25.2–33.5)
MCHC RBC AUTO-ENTMCNC: 31 G/DL (ref 28.4–34.8)
MCV RBC AUTO: 87 FL (ref 82.6–102.9)
MONOCYTES # BLD: 8 % (ref 3–12)
NRBC AUTOMATED: 0 PER 100 WBC
PDW BLD-RTO: 14.8 % (ref 11.8–14.4)
PLATELET # BLD: 360 K/UL (ref 138–453)
PLATELET ESTIMATE: ABNORMAL
PMV BLD AUTO: 11 FL (ref 8.1–13.5)
RBC # BLD: 3.38 M/UL (ref 3.95–5.11)
RBC # BLD: ABNORMAL 10*6/UL
SEG NEUTROPHILS: 79 % (ref 36–65)
SEGMENTED NEUTROPHILS ABSOLUTE COUNT: 14.57 K/UL (ref 1.5–8.1)
WBC # BLD: 18.5 K/UL (ref 3.5–11.3)
WBC # BLD: ABNORMAL 10*3/UL

## 2021-07-28 PROCEDURE — 6370000000 HC RX 637 (ALT 250 FOR IP): Performed by: STUDENT IN AN ORGANIZED HEALTH CARE EDUCATION/TRAINING PROGRAM

## 2021-07-28 PROCEDURE — 6360000002 HC RX W HCPCS: Performed by: STUDENT IN AN ORGANIZED HEALTH CARE EDUCATION/TRAINING PROGRAM

## 2021-07-28 PROCEDURE — 2580000003 HC RX 258: Performed by: STUDENT IN AN ORGANIZED HEALTH CARE EDUCATION/TRAINING PROGRAM

## 2021-07-28 PROCEDURE — 1220000000 HC SEMI PRIVATE OB R&B

## 2021-07-28 PROCEDURE — 36415 COLL VENOUS BLD VENIPUNCTURE: CPT

## 2021-07-28 PROCEDURE — 85025 COMPLETE CBC W/AUTO DIFF WBC: CPT

## 2021-07-28 PROCEDURE — 2500000003 HC RX 250 WO HCPCS: Performed by: STUDENT IN AN ORGANIZED HEALTH CARE EDUCATION/TRAINING PROGRAM

## 2021-07-28 RX ORDER — LANOLIN ALCOHOL/MO/W.PET/CERES
325 CREAM (GRAM) TOPICAL 2 TIMES DAILY
Qty: 90 TABLET | Refills: 3 | Status: SHIPPED | OUTPATIENT
Start: 2021-07-28

## 2021-07-28 RX ADMIN — IBUPROFEN 600 MG: 600 TABLET, FILM COATED ORAL at 13:10

## 2021-07-28 RX ADMIN — DOCUSATE SODIUM 100 MG: 100 CAPSULE ORAL at 09:10

## 2021-07-28 RX ADMIN — CLINDAMYCIN IN 5 PERCENT DEXTROSE 900 MG: 18 INJECTION, SOLUTION INTRAVENOUS at 09:10

## 2021-07-28 RX ADMIN — OXYCODONE HYDROCHLORIDE AND ACETAMINOPHEN 1 TABLET: 5; 325 TABLET ORAL at 03:55

## 2021-07-28 RX ADMIN — IBUPROFEN 600 MG: 600 TABLET, FILM COATED ORAL at 22:16

## 2021-07-28 RX ADMIN — Medication 1 TABLET: at 09:10

## 2021-07-28 RX ADMIN — OXYCODONE HYDROCHLORIDE AND ACETAMINOPHEN 2 TABLET: 5; 325 TABLET ORAL at 09:10

## 2021-07-28 RX ADMIN — AMPICILLIN SODIUM 2000 MG: 2 INJECTION, POWDER, FOR SOLUTION INTRAMUSCULAR; INTRAVENOUS at 02:11

## 2021-07-28 RX ADMIN — LABETALOL HCL 100 MG: 100 TABLET, FILM COATED ORAL at 09:10

## 2021-07-28 ASSESSMENT — PAIN DESCRIPTION - LOCATION
LOCATION: ABDOMEN
LOCATION: ABDOMEN

## 2021-07-28 ASSESSMENT — PAIN DESCRIPTION - FREQUENCY
FREQUENCY: INTERMITTENT
FREQUENCY: INTERMITTENT

## 2021-07-28 ASSESSMENT — PAIN SCALES - GENERAL
PAINLEVEL_OUTOF10: 4
PAINLEVEL_OUTOF10: 3
PAINLEVEL_OUTOF10: 0
PAINLEVEL_OUTOF10: 3
PAINLEVEL_OUTOF10: 2
PAINLEVEL_OUTOF10: 7
PAINLEVEL_OUTOF10: 3
PAINLEVEL_OUTOF10: 3

## 2021-07-28 ASSESSMENT — PAIN DESCRIPTION - DESCRIPTORS
DESCRIPTORS: BURNING
DESCRIPTORS: BURNING

## 2021-07-28 ASSESSMENT — PAIN DESCRIPTION - PROGRESSION
CLINICAL_PROGRESSION: GRADUALLY IMPROVING
CLINICAL_PROGRESSION: NOT CHANGED

## 2021-07-28 ASSESSMENT — PAIN DESCRIPTION - ORIENTATION
ORIENTATION: MID;LOWER
ORIENTATION: MID;LOWER

## 2021-07-28 ASSESSMENT — PAIN - FUNCTIONAL ASSESSMENT
PAIN_FUNCTIONAL_ASSESSMENT: ACTIVITIES ARE NOT PREVENTED

## 2021-07-28 ASSESSMENT — PAIN DESCRIPTION - PAIN TYPE
TYPE: SURGICAL PAIN
TYPE: SURGICAL PAIN

## 2021-07-28 NOTE — PROGRESS NOTES
Humberto met with mom and MGM to introduce self. Mom and MGM shared her ethnicity is [place] and stated they appreciated writer speaking in [place] MGM is present to be with mom and  named Rebbeca Dubin. Mom reports having safe sleep, a car seat and all baby item for . MGM reports she flew in from [place] to be with her since Emely Ponce is mom's first child. Mom reports she was able to see pt in NICU and is happy yet concerned about the yellow spots. Mom states she was informed pt has low levels for jaundice. Sw inquired about mom living in [address] but has [place]. Mom states she initially went to visit in [place] and had never planned to live there. Mom states she attends all her medical appointment in [place]  and plans to keep it that way. Mom plans to establish care for pt in [place] too, unsure where just yet. Mom states she attempted to change all of her benefits but due to the language barrier no one at the agencies were able to help mom in [place]. Mom asked for assistance with applying for [facility]. Sw fist asked if  in her county in [facility] and mom stated no, in [place]. Humberto provided mom with the phone number to [facility] and will assist if there is a language barrier when calling WIC. Mom states she has family and friends in [facility] for [address]. Mom report FOB is [facility] who was involved until recently. Humberto provided mom with a business card for mom to reach out to writer with any needs, concerns, support.

## 2021-07-28 NOTE — CONSULTS
Used  for breast feeding/pumping information. Discussed medical necessity form fore a breast pump. Mom has written booklet in Telugu as well. Reviewed breast pump-purlan cream.

## 2021-07-28 NOTE — PROGRESS NOTES
POST OPERATIVE DAY # 1    Soco Merchant is a 27 y.o. female   This patient was seen and examined today. Her pregnancy was complicated by:   Patient Active Problem List   Diagnosis    Abnormal quad screen    Hypertension affecting pregnancy in third trimester    Hypothyroid in pregnancy, antepartum, third trimester    37 weeks gestation of pregnancy    Chorioamnionitis in third trimester    PLTCS (gen) 7/27/21 F Apg 5/8 Wt 6#6       Today she is doing well without any chief complaint. Her lochia is light. She denies chest pain, shortness of breath, headache, lightheadedness, and blurred vision. She is  breast feeding and she denies any signs or symptoms of mastitis. She is ambulating well. She is voiding without difficulty. She currently denies S/S of postpartum depression. Flatus absent. Bowel movement absent. She is tolerating solids.     Vital Signs:  Vitals:    07/27/21 1730 07/27/21 1800 07/27/21 2056 07/27/21 2345   BP: (!) 148/78 127/74 115/69 113/70   Pulse: 95 85 96 89   Resp: 24 16 18 20   Temp:  98.2 °F (36.8 °C) 99.2 °F (37.3 °C) 97.3 °F (36.3 °C)   SpO2: 95% 95% 96% 96%   Weight:             Urine Input & Output last 24hrs:     Intake/Output Summary (Last 24 hours) at 7/28/2021 0050  Last data filed at 7/27/2021 2345  Gross per 24 hour   Intake 450 ml   Output 4790 ml   Net -4340 ml       Physical Exam:  General:  no apparent distress, alert, and cooperative  Neurologic:  alert, oriented, normal speech, no focal findings or movement disorder noted  Lungs:  No increased work of breathing, good air exchange, clear to auscultation bilaterally, no crackles or wheezing  Heart:  regular rate and rhythm    Abdomen: abdomen soft, non-distended, non-tender  Fundus: non-tender, normal size, firm, below umbilicus  Incision: Prevena dressing in place  Extremities:  no calf tenderness, non edematous    Labs:  Lab Results   Component Value Date    WBC 8.7 07/26/2021    HGB 11.7 (L) 07/26/2021    HCT 38.3 2021    MCV 84.2 2021     2021       Assessment/Plan:  1. Drake Diaz is a  POD # 1 s/p PLTCS   - Doing well, VSS    - female infant in NICU   - Encourage ambulation and use of incentive spirometer   - D/C huerta catheter and saline lock IV on POD #1    - Duramorph and toradol for pain   - Will switch to percocet and motrin   - H&H pending this Am    - Continue post op care  2. Rh positive/Rubella immune   - MMR and rhogam not indicated   3. Breast feeding    - Denies s/s of mastitis  4. Chorioamnionitis   - Amp/gent/clinda x 24 hours   - Cultures pending  5. cHTN   - Denies s/s of pre E   - BP normotensive   - Labetalol 100 mg BID   - PreE labs wnl, P/C 0.16 ()   6. Hypothyroidism   - Stable no meds  7. BMI 35      Counseling Completed:  Secondary Smoke risks and Sudden Infant Death Syndrome were reviewed with recommendations. Infant sleeping, \"back to sleep\" and avoidance of co-sleeping recommendations were reviewed. Signs and Symptoms of Post Partum Depression were reviewed. The patient is to call if any occur. Signs and symptoms of Mastitis were reviewed. The patient is to call if any occur for follow up.   Discharge instructions including pelvic rest, incision care, 15 lb weight restriction, no driving with pain medicine and office follow-up were reviewed with patient     Attending Physician: Dr. Obed Bowles,   Ob/Gyn Resident  2021, 12:50 AM

## 2021-07-28 NOTE — CARE COORDINATION
POST-PARTUM TRANSITIONAL CARE PLAN    37 weeks gestation of pregnancy [Z3A.37]    Writer met w/ Osiris at bedside to discuss DCP with the use of the video  as she is [place] speaking. Her mom was also at the Bedside along / [facility] from Lactation. Osiris is S/P CS on 7/27/2021 @ 1450 at 37w1d of Female Infant    Infant name on BC: Andrew Epley. Infant to NICU due to r/o sepsis due to Chorio and ABO incompatibility/feeding tolerance and temp instability. Infant PCP Unsure, Mom said would like to go to [facility] upon DC. FOB: Azra Titusjamir    Writer verified name/address/phone number correct on facesheet. She stated she moved to [place] about 2-3 months ago. [employer] insurance correct. Writer explained since she lives in [place] she needs to switch to [place]. She will need to contact [address] [place] [address]. CM asked if she wanted me to have someone come and assist her w/ that while inpatient. Rebecca Torres declined stating she can call and get it switched. Writer notified Rebecca Torres she has 30 days from date of birth to add [place] to insurance policy. Sh3 verbalized understanding. CM discussed NICU policies and visitation/rounding. She asked if her mom could come see baby in NICU. This CM apologized, however, no grandparents are allowed to visit at this time. Only the banded parents/legal guardians. She and her mom verbalized understanding. She also asked this CM to help set up the Camera on her phone so she can see [place] in the NICU when she is not there. CM explained would have someone assist her w/ that. She also asked this CM if Chastity isn't ready to DC when she is if she is able to stay in NICU w/ her baby. This CM explained we don't have that capability, however, sometimes mom's can stay right in their room after they are [address] if there is room and we also have [address]. There is no Fee and explained [facility].      No previous home care or dme. She did say though she had a prescription for a BP cuff but pharmacy would not fill it. So she does not have one currently. Anticipate DC 7/31/2021    CM continue to follow for any DC needs.

## 2021-07-29 LAB
ABSOLUTE EOS #: 0.1 K/UL (ref 0–0.44)
ABSOLUTE IMMATURE GRANULOCYTE: 0.21 K/UL (ref 0–0.3)
ABSOLUTE LYMPH #: 2.13 K/UL (ref 1.1–3.7)
ABSOLUTE MONO #: 1.17 K/UL (ref 0.1–1.2)
BASOPHILS # BLD: 0 % (ref 0–2)
BASOPHILS ABSOLUTE: 0.03 K/UL (ref 0–0.2)
DIFFERENTIAL TYPE: ABNORMAL
EOSINOPHILS RELATIVE PERCENT: 1 % (ref 1–4)
HCT VFR BLD CALC: 33.3 % (ref 36.3–47.1)
HEMOGLOBIN: 9.8 G/DL (ref 11.9–15.1)
IMMATURE GRANULOCYTES: 1 %
LYMPHOCYTES # BLD: 14 % (ref 24–43)
MCH RBC QN AUTO: 25.7 PG (ref 25.2–33.5)
MCHC RBC AUTO-ENTMCNC: 29.4 G/DL (ref 28.4–34.8)
MCV RBC AUTO: 87.2 FL (ref 82.6–102.9)
MONOCYTES # BLD: 8 % (ref 3–12)
NRBC AUTOMATED: 0 PER 100 WBC
PDW BLD-RTO: 14.8 % (ref 11.8–14.4)
PLATELET # BLD: 201 K/UL (ref 138–453)
PLATELET ESTIMATE: ABNORMAL
PMV BLD AUTO: 10.9 FL (ref 8.1–13.5)
RBC # BLD: 3.82 M/UL (ref 3.95–5.11)
RBC # BLD: ABNORMAL 10*6/UL
SEG NEUTROPHILS: 76 % (ref 36–65)
SEGMENTED NEUTROPHILS ABSOLUTE COUNT: 11.84 K/UL (ref 1.5–8.1)
SURGICAL PATHOLOGY REPORT: NORMAL
WBC # BLD: 15.5 K/UL (ref 3.5–11.3)
WBC # BLD: ABNORMAL 10*3/UL

## 2021-07-29 PROCEDURE — 6370000000 HC RX 637 (ALT 250 FOR IP): Performed by: STUDENT IN AN ORGANIZED HEALTH CARE EDUCATION/TRAINING PROGRAM

## 2021-07-29 PROCEDURE — 85025 COMPLETE CBC W/AUTO DIFF WBC: CPT

## 2021-07-29 PROCEDURE — 99024 POSTOP FOLLOW-UP VISIT: CPT | Performed by: OBSTETRICS & GYNECOLOGY

## 2021-07-29 PROCEDURE — 36415 COLL VENOUS BLD VENIPUNCTURE: CPT

## 2021-07-29 PROCEDURE — 1220000000 HC SEMI PRIVATE OB R&B

## 2021-07-29 RX ADMIN — IBUPROFEN 600 MG: 600 TABLET, FILM COATED ORAL at 20:42

## 2021-07-29 RX ADMIN — IBUPROFEN 600 MG: 600 TABLET, FILM COATED ORAL at 14:50

## 2021-07-29 RX ADMIN — LABETALOL HCL 100 MG: 100 TABLET, FILM COATED ORAL at 08:49

## 2021-07-29 RX ADMIN — MAGNESIUM HYDROXIDE 30 ML: 400 SUSPENSION ORAL at 08:49

## 2021-07-29 RX ADMIN — OXYCODONE HYDROCHLORIDE AND ACETAMINOPHEN 1 TABLET: 5; 325 TABLET ORAL at 08:49

## 2021-07-29 RX ADMIN — IBUPROFEN 600 MG: 600 TABLET, FILM COATED ORAL at 08:46

## 2021-07-29 RX ADMIN — DOCUSATE SODIUM 100 MG: 100 CAPSULE ORAL at 08:46

## 2021-07-29 RX ADMIN — SIMETHICONE 80 MG: 80 TABLET, CHEWABLE ORAL at 08:49

## 2021-07-29 RX ADMIN — LABETALOL HCL 100 MG: 100 TABLET, FILM COATED ORAL at 20:42

## 2021-07-29 RX ADMIN — OXYCODONE HYDROCHLORIDE AND ACETAMINOPHEN 2 TABLET: 5; 325 TABLET ORAL at 01:05

## 2021-07-29 ASSESSMENT — PAIN SCALES - GENERAL
PAINLEVEL_OUTOF10: 3
PAINLEVEL_OUTOF10: 8
PAINLEVEL_OUTOF10: 0
PAINLEVEL_OUTOF10: 4

## 2021-07-29 ASSESSMENT — PAIN DESCRIPTION - PROGRESSION
CLINICAL_PROGRESSION: NOT CHANGED

## 2021-07-29 NOTE — FLOWSHEET NOTE
Went to do vitals and assessment on patient, she is not in her room. Per patients mother she is down in NICU. I asked her to have the patient call out when she returns so she can have her labetalol and vitals done.

## 2021-07-29 NOTE — LACTATION NOTE
Attempted feeding, baby eventually latched with 20mm nipple shield but minimal sucking effort. Reinforced need to continue with breast pump.

## 2021-07-29 NOTE — PROGRESS NOTES
Obstetrical Rounds:    POD#: 2  Hospital Day: 3  Procedure: primary  section    Date: 2021  Time: 9:17 AM        Patient Name: Tierra Mercado  Patient : 1995  Room/Bed: 9286/4192-69  Admission Date/Time: 2021  2:20 PM  MRN #: [id number]  [facility] #: [id number]        Attending Physician Statement  I have discussed the care of Tierra Mercado, including pertinent history and exam findings,  with the resident. I have reviewed their note in the electronic medical record. I have seen and examined the patient and the key elements of all parts of the encounter have been performed/reviewed by me . I agree with the assessment, plan and orders as documented by the resident. Upon rounding denita was in NICU with her baby. Per nursing pt doing well. Anticipate discharge day 4. Final uterine culture pending. Pt with leukocytosis will recheck CBC. Pt has been afebrile. If WBC decreasing consider po antibiotics.      Vitals:    21 0300   BP: 129/75   Pulse: 98   Resp: 19   Temp: 98.4 °F (36.9 °C)   SpO2:        Admission on 2021   Component Date Value Ref Range Status    Expiration Date 2021,2359   Final    Arm Band Number 2021 [id number]   Final    ABO/Rh 2021 O POSITIVE   Final    Antibody Screen 2021 NEGATIVE   Final    Amphetamine Screen, Ur 2021 NEGATIVE  NEGATIVE Final    Comment:       (Positive cutoff 1000 ng/mL)                  Barbiturate Screen, Ur 2021 NEGATIVE  NEGATIVE Final    Comment:       (Positive cutoff 200 ng/mL)                  Benzodiazepine Screen, Urine 2021 NEGATIVE  NEGATIVE Final    Comment:       (Positive cutoff 200 ng/mL)                  Cocaine Metabolite, Urine 2021 NEGATIVE  NEGATIVE Final    Comment:       (Positive cutoff 300 ng/mL)                  Methadone Screen, Urine 2021 NEGATIVE  NEGATIVE Final    Comment:       (Positive cutoff 300 ng/mL)                  Opiates, Urine 07/26/2021 NEGATIVE  NEGATIVE Final    Comment:       (Positive cutoff 300 ng/mL)                  Phencyclidine, Urine 07/26/2021 NEGATIVE  NEGATIVE Final    Comment:       (Positive cutoff 25 ng/mL)                  Propoxyphene, Urine 07/26/2021 NOT REPORTED  NEGATIVE Final    Cannabinoid Scrn, Ur 07/26/2021 NEGATIVE  NEGATIVE Final    Comment:       (Positive cutoff 50 ng/mL)                  Oxycodone Screen, Ur 07/26/2021 NEGATIVE  NEGATIVE Final    Comment:       (Positive cutoff 100 ng/mL)                  Methamphetamine, Urine 07/26/2021 NOT REPORTED  NEGATIVE Final    Tricyclic Antidepressants, Urine 07/26/2021 NOT REPORTED  NEGATIVE Final    MDMA, Urine 07/26/2021 NOT REPORTED  NEGATIVE Final    Buprenorphine Urine 07/26/2021 NOT REPORTED  NEGATIVE Final    Test Information 07/26/2021 Assay provides medical screening only. The absence of expected drug(s) and/or metabolite(s) may indicate diluted or adulterated urine, limitations of testing or timing of collection. Final    Comment: Testing for legal purposes should be confirmed by another method. To request confirmation   of test result, please call the lab within 7 days of sample submission.  T. pallidum, IgG 07/26/2021 NONREACTIVE  NONREACTIVE Final    Comment:       T. pallidum antibodies are not detected. There is no serological evidence of infection with T. pallidum (early primary syphilis   cannot be excluded). Retest in 2-4 weeks if syphilis is clinically suspect.             WBC 07/26/2021 8.7  3.5 - 11.3 k/uL Final    RBC 07/26/2021 4.55  3.95 - 5.11 m/uL Final    Hemoglobin 07/26/2021 11.7* 11.9 - 15.1 g/dL Final    Hematocrit 07/26/2021 38.3  36.3 - 47.1 % Final    MCV 07/26/2021 84.2  82.6 - 102.9 fL Final    MCH 07/26/2021 25.7  25.2 - 33.5 pg Final    MCHC 07/26/2021 30.5  28.4 - 34.8 g/dL Final    RDW 07/26/2021 14.2  11.8 - 14.4 % Final    Platelets 29/85/4890 192  138 - 453 k/uL Final    MPV 07/26/2021 11.4 8.1 - 13.5 fL Final    NRBC Automated 07/26/2021 0.0  0.0 per 100 WBC Final    Differential Type 07/26/2021 NOT REPORTED   Final    Seg Neutrophils 07/26/2021 67* 36 - 65 % Final    Lymphocytes 07/26/2021 20* 24 - 43 % Final    Monocytes 07/26/2021 9  3 - 12 % Final    Eosinophils % 07/26/2021 1  1 - 4 % Final    Basophils 07/26/2021 1  0 - 2 % Final    Immature Granulocytes 07/26/2021 2* 0 % Final    Segs Absolute 07/26/2021 5.87  1.50 - 8.10 k/uL Final    Absolute Lymph # 07/26/2021 1.74  1.10 - 3.70 k/uL Final    Absolute Mono # 07/26/2021 0.77  0.10 - 1.20 k/uL Final    Absolute Eos # 07/26/2021 0.10  0.00 - 0.44 k/uL Final    Basophils Absolute 07/26/2021 0.04  0.00 - 0.20 k/uL Final    Absolute Immature Granulocyte 07/26/2021 0.14  0.00 - 0.30 k/uL Final    WBC Morphology 07/26/2021 NOT REPORTED   Final    RBC Morphology 07/26/2021 NOT REPORTED   Final    Platelet Estimate 10/50/9270 NOT REPORTED   Final    Specimen Description 07/26/2021 . NASOPHARYNGEAL SWAB   Final    SARS-CoV-2, Rapid 07/26/2021 Not Detected  Not Detected Final    Comment:       Rapid NAAT:  The specimen is NEGATIVE for SARS-CoV-2, the novel coronavirus associated with   COVID-19. The ID NOW COVID-19 assay is designed to detect the virus that causes COVID-19 in patients   with signs and symptoms of infection who are suspected of COVID-19. An individual without symptoms of COVID-19 and who is not shedding SARS-CoV-2 virus would   expect to have a negative (not detected) result in this assay. Negative results should be treated as presumptive and, if inconsistent with clinical signs   and symptoms or necessary for patient management,  should be tested with an alternative molecular assay. Negative results do not preclude   SARS-CoV-2 infection and   should not be used as the sole basis for patient management decisions.          Fact sheet for Healthcare Providers: Gisele  Fact sheet for Patients: Gisele          Methodology: Isothermal Nucleic Acid Amplification      Total Protein, Urine 07/26/2021 19  mg/dL Final    No normal range established.  Creatinine, Ur 07/26/2021 115.6  28.0 - 217.0 mg/dL Final    Urine Total Protein Creatinine Rat* 07/26/2021 0.16  0.00 - 0.20 Final    Glucose 07/26/2021 111* 70 - 99 mg/dL Final    BUN 07/26/2021 7  6 - 20 mg/dL Final    CREATININE 07/26/2021 <0.40* 0.50 - 0.90 mg/dL Final    Bun/Cre Ratio 07/26/2021 NOT REPORTED  9 - 20 Final    Calcium 07/26/2021 9.6  8.6 - 10.4 mg/dL Final    Sodium 07/26/2021 139  135 - 144 mmol/L Final    Potassium 07/26/2021 3.9  3.7 - 5.3 mmol/L Final    Chloride 07/26/2021 104  98 - 107 mmol/L Final    CO2 07/26/2021 22  20 - 31 mmol/L Final    Anion Gap 07/26/2021 13  9 - 17 mmol/L Final    Alkaline Phosphatase 07/26/2021 151* 35 - 104 U/L Final    ALT 07/26/2021 16  5 - 33 U/L Final    AST 07/26/2021 19  <32 U/L Final    Total Bilirubin 07/26/2021 0.16* 0.3 - 1.2 mg/dL Final    Total Protein 07/26/2021 6.8  6.4 - 8.3 g/dL Final    Albumin 07/26/2021 3.8  3.5 - 5.2 g/dL Final    Albumin/Globulin Ratio 07/26/2021 NOT REPORTED  1.0 - 2.5 Final    GFR Non- 07/26/2021 CANNOT BE CALCULATED  >60 mL/min Final    GFR  07/26/2021 CANNOT BE CALCULATED  >60 mL/min Final    GFR Comment 07/26/2021        Final    Comment: Average GFR for 20-28 years old:   80 mL/min/1.73sq m  Chronic Kidney Disease:   <60 mL/min/1.73sq m  Kidney failure:   <15 mL/min/1.73sq m              eGFR calculated using average adult body mass.  Additional eGFR calculator available at:        [employer]            GFR Staging 07/26/2021 NOT REPORTED   Final    TSH 07/26/2021 0.09* 0.30 - 5.00 mIU/L Final    Thyroxine, Free 07/26/2021 1.07  0.93 - 1.70 ng/dL Final    WBC 07/28/2021 18.5* 3.5 - 11.3 k/uL Final    RBC 07/28/2021 3.38* 3.95 - 5.11 m/uL Final    Hemoglobin 2021 9.1* 11.9 - 15.1 g/dL Final    Hematocrit 2021 29.4* 36.3 - 47.1 % Final    MCV 2021 87.0  82.6 - 102.9 fL Final    MCH 2021 26.9  25.2 - 33.5 pg Final    MCHC 2021 31.0  28.4 - 34.8 g/dL Final    RDW 2021 14.8* 11.8 - 14.4 % Final    Platelets  360  138 - 453 k/uL Final    MPV 2021 11.0  8.1 - 13.5 fL Final    NRBC Automated 2021 0.0  0.0 per 100 WBC Final    Differential Type 2021 NOT REPORTED   Final    WBC Morphology 2021 NOT REPORTED   Final    RBC Morphology 2021 ANISOCYTOSIS PRESENT   Final    Platelet Estimate  NOT REPORTED   Final    Seg Neutrophils 2021 79* 36 - 65 % Final    Lymphocytes 2021 12* 24 - 43 % Final    Monocytes 2021 8  3 - 12 % Final    Eosinophils % 2021 0* 1 - 4 % Final    Basophils 2021 0  0 - 2 % Final    Immature Granulocytes 2021 1* 0 % Final    Segs Absolute 2021 14.57* 1.50 - 8.10 k/uL Final    Absolute Lymph # 2021 2.13  1.10 - 3.70 k/uL Final    Absolute Mono # 2021 1.48* 0.10 - 1.20 k/uL Final    Absolute Eos # 2021 0.06  0.00 - 0.44 k/uL Final    Basophils Absolute 2021 0.04  0.00 - 0.20 k/uL Final    Absolute Immature Granulocyte 2021 0.22  0.00 - 0.30 k/uL Final    Specimen Description 2021 . ENDOMETRIUM SWAB   Preliminary    Special Requests 2021 NOT REPORTED   Preliminary    Direct Exam 2021 MANY NEUTROPHILS*  Preliminary    Direct Exam 2021 RARE GRAM POSITIVE COCCI IN PAIRS*  Preliminary    Culture 2021 NORMAL ALINA   Preliminary         [facility]  [address]  [phone]      Discharge Instructions for  Birth     During a  section (), an incision is made in the abdomen and uterus (womb) to deliver the baby.  The normal hospital stay is 2-4 days. Steps to Take   Home Care    · For the first 1-2 weeks, ask for someone to help you at home. · Let people help you. Take frequent rest breaks. · For vaginal bleeding, use extra absorbent pads. · Keep the incision area clean and dry. · Ask your doctor about when it is safe to shower, bathe, or soak in water. · Avoid heavy lifting for six weeks. Diet    After a  birth, you will start with a clear liquid diet. Examples include: Jell-o, broth, and ginger ale. If you tolerate that, you can slowly go back to your regular diet. Stay away from anything greasy or spicy right after surgery. These types of foods can upset your stomach. Drink lots of fluids to prevent constipation. Physical Activity    · Do not lift anything heavier than your baby. · When shifting positions, use a pillow to support the area where the incisions were made. · Get up slowly. This will help you to avoid feeling dizzy or light headed. · Try to move around each day. Light physical activity will help with your recovery. · Ask your doctor when you will be able to go back to work. · Do not drive unless your doctor has given you permission to do so. Do not drive if you are taking prescription pain medicine. · Ask your doctor when you will be able to resume sexual activity. If you have not done so already, talk to your doctor about birth control options. Medications    Your doctor may recommend pain medicine to ease discomfort. If you are taking medicines, follow these general guidelines:   · Take your medicine as directed. Do not change the amount or the schedule. · Do not stop taking them without talking to your doctor. · Do not share them. · Know what the results and side effects. Report them to your doctor. · Some drugs can be dangerous when mixed. Talk to a doctor or pharmacist if you are taking more than one drug.  This includes over-the-counter medicine and herb or dietary supplements. · Plan ahead for refills so you don't run out. Lifestyle Changes    You and your doctor will plan lifestyle changes that will help you recover. To get encouragement and learn strategies, consider joining a support group for new mothers. Follow-up   Make a follow-up appointment as directed by your doctor. Call Your Doctor If Any of the Following Occurs   After you leave the hospital, call your doctor if any of the following occurs:   · Signs of infection, including fever and chills   · Heavy vaginal bleeding   · Foul-smelling vaginal discharge   · Excessive bleeding, redness, swelling, increasing pain or discharge from the incision site   · Nausea and/or vomiting that you cannot control with the medicines you were given after surgery, or which persist for more than two days after discharge from the hospital   · Pain that you cannot control with the medicines you have been given   · Swelling and/or pain in one or both legs   · Cough, shortness of breath, or chest pain   · Joint pain, fatigue, stiffness, rash, or other new symptoms   · Become dizzy or faint   If you think you have an emergency,  CALL 911  . Home care, Restrictions,  Follow up Care, and birth control review completed    RTO 1 weeks    Secondary Smoke risks and Sudden Infant Death Syndrome were reviewed with recommendations. Cessation options discussed. Signs and Symptoms of Post Partum Depression were reviewed. The patient is to call if any occur. Signs and symptoms of Mastitis were reviewed. The patient is to call if any occur for follow up.       Attending's Name:  Electronically signed by Ghazal Bolaños DO on 7/29/2021 at 9:17 AM

## 2021-07-29 NOTE — PROGRESS NOTES
POST OPERATIVE DAY # 2    Drake Diaz is a 27 y.o. female   This patient was seen and examined today. Her pregnancy was complicated by:   Patient Active Problem List   Diagnosis    Abnormal quad screen    Hypertension affecting pregnancy in third trimester    Hypothyroid in pregnancy, antepartum, third trimester    37 weeks gestation of pregnancy    Chorioamnionitis in third trimester    PLTCS (gen) 7/27/21 F Apg 5/8 Wt 6#6       Today she is doing well without any chief complaint. Her lochia is light. She denies chest pain, shortness of breath, headache, blurred vision and peripheral edema. She is  breast feeding and she denies any signs or symptoms of mastitis. She is ambulating well. She is voiding without difficulty. She currently denies S/S of postpartum depression. Flatus present. Bowel movement absent. Patient requesting scheduled stool softener. She is tolerating solids. Vital Signs:  Vitals:    07/28/21 1310 07/28/21 1730 07/28/21 2200 07/29/21 0300   BP: 108/71 100/62 130/72 129/75   Pulse: 98 106 105 98   Resp: 20 20 19 19   Temp: 98 °F (36.7 °C) 97.8 °F (36.6 °C) 98.4 °F (36.9 °C) 98.4 °F (36.9 °C)   SpO2:       Weight:             Urine Input & Output last 24hrs:   No intake or output data in the 24 hours ending 07/29/21 7321    Physical Exam:  General:  no apparent distress, alert and cooperative  Neurologic:  alert, oriented, normal speech, no focal findings or movement disorder noted  Lungs:  No increased work of breathing, good air exchange  Heart:  regular rate and rhythm    Abdomen: abdomen soft, non-distended, non-tender  Fundus: non-tender, normal size, firm, below umbilicus  Incision: Prevena in place and functioning  Extremities:  no calf tenderness, non edematous    Labs:  Lab Results   Component Value Date    WBC 18.5 (H) 07/28/2021    HGB 9.1 (L) 07/28/2021    HCT 29.4 (L) 07/28/2021    MCV 87.0 07/28/2021     07/28/2021       Assessment/Plan:  1.  [facility] Doc is a  POD # 2 s/p PLTCS   - Doing well, VSS    - female infant in NICU   - Encourage ambulation and use of incentive spirometer  2. Rh positive/Rubella immune  3. Breast feeding    - Denies s/s of mastitis    4. Chorioamnionitis   - S/p Amp/gent/clinda x 24 hr   - VSS, afebrile, last fever on    - Cultures pending   - Clinically asymptomatic    5. cHTN   - BP normotensive   - Labetalol 100 mg BID   - PreE labs wnl, P/C 0.16   - Denies s/s of PreE    6. Hypothyroidism   - No meds   - Last TSH 0.09, T4 1.07 21   - Clinically asymptomatic   - Encourage follow up PP    7. BMI 35   - SCDs for DVT prophylaxis    8. Continue post-op care. Counseling Completed:  Secondary Smoke risks and Sudden Infant Death Syndrome were reviewed with recommendations. Infant sleeping, \"back to sleep\" and avoidance of co-sleeping recommendations were reviewed. Signs and Symptoms of Post Partum Depression were reviewed. The patient is to call if any occur. Signs and symptoms of Mastitis were reviewed. The patient is to call if any occur for follow up. Discharge instructions including pelvic rest, incision care, 15 lb weight restriction, no driving with pain medicine and office follow-up were reviewed with patient     Attending Physician: Dr. Gerber Benites, DO  Ob/Gyn Resident  2021, 6:32 AM      Resident Physician Statement  I have discussed the case, including pertinent history and exam findings with the above resident. I have personally seen the patient. I agree with the assessment, plan and orders as documented. I have made changes to the above note as needed. I have discussed the case with above named attending. All discharge instructions reviewed with the patient. Patient is in agreement with plan. All questions answered.     Michael Rehman, DO  OB/GYN Resident PGY4  2021  8:02 AM

## 2021-07-29 NOTE — LACTATION NOTE
Met in NICU for assist with feeding.   Reviewed cross cradle and football holds, baby sleepy/disinterested at breast.

## 2021-07-29 NOTE — H&P
Patient decline translate service for assessment and vitals. She stated she could understand me. I told her if she changes her mind to please tell me.

## 2021-07-30 PROCEDURE — 6370000000 HC RX 637 (ALT 250 FOR IP): Performed by: STUDENT IN AN ORGANIZED HEALTH CARE EDUCATION/TRAINING PROGRAM

## 2021-07-30 PROCEDURE — 1220000000 HC SEMI PRIVATE OB R&B

## 2021-07-30 RX ADMIN — IBUPROFEN 600 MG: 600 TABLET, FILM COATED ORAL at 11:25

## 2021-07-30 RX ADMIN — LABETALOL HCL 100 MG: 100 TABLET, FILM COATED ORAL at 20:44

## 2021-07-30 RX ADMIN — OXYCODONE HYDROCHLORIDE AND ACETAMINOPHEN 1 TABLET: 5; 325 TABLET ORAL at 00:35

## 2021-07-30 RX ADMIN — DOCUSATE SODIUM 100 MG: 100 CAPSULE ORAL at 11:24

## 2021-07-30 RX ADMIN — OXYCODONE HYDROCHLORIDE AND ACETAMINOPHEN 1 TABLET: 5; 325 TABLET ORAL at 05:55

## 2021-07-30 RX ADMIN — IBUPROFEN 600 MG: 600 TABLET, FILM COATED ORAL at 05:53

## 2021-07-30 RX ADMIN — OXYCODONE HYDROCHLORIDE AND ACETAMINOPHEN 1 TABLET: 5; 325 TABLET ORAL at 20:44

## 2021-07-30 RX ADMIN — IBUPROFEN 600 MG: 600 TABLET, FILM COATED ORAL at 19:25

## 2021-07-30 RX ADMIN — LABETALOL HCL 100 MG: 100 TABLET, FILM COATED ORAL at 11:24

## 2021-07-30 RX ADMIN — Medication 1 TABLET: at 11:25

## 2021-07-30 ASSESSMENT — PAIN SCALES - GENERAL
PAINLEVEL_OUTOF10: 4
PAINLEVEL_OUTOF10: 5
PAINLEVEL_OUTOF10: 5
PAINLEVEL_OUTOF10: 2
PAINLEVEL_OUTOF10: 6
PAINLEVEL_OUTOF10: 2
PAINLEVEL_OUTOF10: 6

## 2021-07-30 NOTE — PROGRESS NOTES
21 Sw attempted speaking with mom but her phoneVM was full. 21 Sw called mom to follow up due to her language barrier. Mom reports she is well and has her  currently with her. Mom reports she is anticipating discharge today and will be staying in [place]. Mom reports her mom is planning on staying with them for a few more days before returning to  [place]. Sw reminded mom about contacting [address] and mom stated she did call and is to be getting a call back from their bi-lingual staff. Mom declined any current needs. Humberto encouraged mom to call writer if [phone] does not contact her next week.

## 2021-07-30 NOTE — PROGRESS NOTES
POST OPERATIVE DAY # 3    Xiao Murillo is a 27 y.o. female   This patient was seen and examined today. Her pregnancy was complicated by:   Patient Active Problem List   Diagnosis    Abnormal quad screen    Hypertension affecting pregnancy in third trimester    Hypothyroid in pregnancy, antepartum, third trimester    37 weeks gestation of pregnancy    Chorioamnionitis in third trimester    PLTCS (gen) 7/27/21 F Apg 5/8 Wt 6#6    Post-operative state       Today she is doing well without any chief complaint. Her lochia is light. She denies chest pain, shortness of breath, headache, lightheadedness, and blurred vision. She is  breast feeding and she denies any signs or symptoms of mastitis. She is ambulating well. She is voiding without difficulty. She currently denies S/S of postpartum depression. Flatus absent. Bowel movement absent. She is tolerating solids.     Vital Signs:  Vitals:    07/29/21 0300 07/29/21 0840 07/29/21 1600 07/29/21 2042   BP: 129/75 139/82 118/73 (!) 145/84   Pulse: 98 98 99 106   Resp: 19 16 18 16   Temp: 98.4 °F (36.9 °C) 98.3 °F (36.8 °C) 98.8 °F (37.1 °C) 98.1 °F (36.7 °C)   SpO2:  100% 100% 97%   Weight:             Urine Input & Output last 24hrs:   No intake or output data in the 24 hours ending 07/30/21 0139    Physical Exam:  General:  no apparent distress, alert, and cooperative  Neurologic:  alert, oriented, normal speech, no focal findings or movement disorder noted  Lungs:  No increased work of breathing, good air exchange, clear to auscultation bilaterally, no crackles or wheezing  Heart:  regular rate and rhythm    Abdomen: abdomen soft, non-distended, non-tender  Fundus: non-tender, normal size, firm, below umbilicus  Incision: Prevena dressing in place  Extremities:  no calf tenderness, non edematous    Labs:  Lab Results   Component Value Date    WBC 15.5 (H) 07/29/2021    HGB 9.8 (L) 07/29/2021    HCT 33.3 (L) 07/29/2021    MCV 87.2 07/29/2021     2021       Assessment/Plan:  1. Souleymane Houser is a  POD # 3 s/p PLTCS   - Doing well, VSS    - Female infant in NICU   - Encourage ambulation and use of incentive spirometer   - Percocet and motrin for pain   - Post op H&H 9.1   - Continue post op care  2. Rh positive/Rubella immune   - MMR and rhogam not indicated   3. Breast feeding    - Denies s/s of mastitis  4. Chorioamnionitis   - S/p Amp/gent/clinda x 24 hours   - Cultures showing many neutrophils, rare gram positive cocci in pairs  5. cHTN   - Denies s/s of pre E   - BP normotensive   - Labetalol 100 mg BID   - PreE labs wnl, P/C 0.16 ()   6. Hypothyroidism   - Stable no meds  7. BMI 35      Counseling Completed:  Secondary Smoke risks and Sudden Infant Death Syndrome were reviewed with recommendations. Infant sleeping, \"back to sleep\" and avoidance of co-sleeping recommendations were reviewed. Signs and Symptoms of Post Partum Depression were reviewed. The patient is to call if any occur. Signs and symptoms of Mastitis were reviewed. The patient is to call if any occur for follow up. Discharge instructions including pelvic rest, incision care, 15 lb weight restriction, no driving with pain medicine and office follow-up were reviewed with patient     Attending Physician: Dr. Josemanuel Guerrero DO  Ob/Gyn Resident  2021, 1:39 AM           Attending Physician Statement  I have discussed the care of Souleymane Houser, including pertinent history and exam findings,  with the resident. I have seen and examined the patient and the key elements of all parts of the encounter have been performed by me. I agree with the assessment, plan and orders as documented by the resident. (GC Modifier)    Pt seen and examined.  used for interaction. She is doing well. Ambulating, tolerating PO, voiding. She is breast pumping. Recommend increase PO hydration and ambulation.     Vitals:    21 0035 21 1130 21 not drive if you are taking prescription pain medicine. · Ask your doctor when you will be able to resume sexual activity. If you have not done so already, talk to your doctor about birth control options. Medications    Your doctor may recommend pain medicine to ease discomfort. If you are taking medicines, follow these general guidelines:   · Take your medicine as directed. Do not change the amount or the schedule. · Do not stop taking them without talking to your doctor. · Do not share them. · Know what the results and side effects. Report them to your doctor. · Some drugs can be dangerous when mixed. Talk to a doctor or pharmacist if you are taking more than one drug. This includes over-the-counter medicine and herb or dietary supplements. · Plan ahead for refills so you don't run out. Lifestyle Changes    You and your doctor will plan lifestyle changes that will help you recover. To get encouragement and learn strategies, consider joining a support group for new mothers. Follow-up   Make a follow-up appointment as directed by your doctor. Call Your Doctor If Any of the Following Occurs   After you leave the hospital, call your doctor if any of the following occurs:   · Signs of infection, including fever and chills   · Heavy vaginal bleeding   · Foul-smelling vaginal discharge   · Excessive bleeding, redness, swelling, increasing pain or discharge from the incision site   · Nausea and/or vomiting that you cannot control with the medicines you were given after surgery, or which persist for more than two days after discharge from the hospital   · Pain that you cannot control with the medicines you have been given   · Swelling and/or pain in one or both legs   · Cough, shortness of breath, or chest pain   · Joint pain, fatigue, stiffness, rash, or other new symptoms   · Become dizzy or faint   If you think you have an emergency,  CALL 911  .        Joyce Newby, DO

## 2021-07-31 VITALS
SYSTOLIC BLOOD PRESSURE: 137 MMHG | WEIGHT: 212 LBS | BODY MASS INDEX: 35.28 KG/M2 | DIASTOLIC BLOOD PRESSURE: 85 MMHG | RESPIRATION RATE: 18 BRPM | TEMPERATURE: 97.7 F | OXYGEN SATURATION: 100 % | HEART RATE: 94 BPM

## 2021-07-31 PROCEDURE — 6370000000 HC RX 637 (ALT 250 FOR IP): Performed by: STUDENT IN AN ORGANIZED HEALTH CARE EDUCATION/TRAINING PROGRAM

## 2021-07-31 RX ORDER — LABETALOL 100 MG/1
100 TABLET, FILM COATED ORAL 2 TIMES DAILY
Qty: 60 TABLET | Refills: 1 | Status: SHIPPED | OUTPATIENT
Start: 2021-07-31 | End: 2021-08-05

## 2021-07-31 RX ADMIN — IBUPROFEN 600 MG: 600 TABLET, FILM COATED ORAL at 04:03

## 2021-07-31 RX ADMIN — LABETALOL HCL 100 MG: 100 TABLET, FILM COATED ORAL at 09:11

## 2021-07-31 RX ADMIN — DOCUSATE SODIUM 100 MG: 100 CAPSULE ORAL at 09:11

## 2021-07-31 RX ADMIN — OXYCODONE HYDROCHLORIDE AND ACETAMINOPHEN 2 TABLET: 5; 325 TABLET ORAL at 15:03

## 2021-07-31 RX ADMIN — Medication 1 TABLET: at 09:11

## 2021-07-31 RX ADMIN — IBUPROFEN 600 MG: 600 TABLET, FILM COATED ORAL at 15:03

## 2021-07-31 RX ADMIN — OXYCODONE HYDROCHLORIDE AND ACETAMINOPHEN 1 TABLET: 5; 325 TABLET ORAL at 04:03

## 2021-07-31 ASSESSMENT — PAIN SCALES - GENERAL
PAINLEVEL_OUTOF10: 3
PAINLEVEL_OUTOF10: 0
PAINLEVEL_OUTOF10: 0
PAINLEVEL_OUTOF10: 7

## 2021-07-31 NOTE — PROGRESS NOTES
POST OPERATIVE DAY # 4    Renetta Martinez is a 27 y.o. female   This patient was seen and examined today. PLTCS under general on 7/27/21        Her pregnancy was complicated by:   Patient Active Problem List   Diagnosis    Abnormal quad screen    Hypertension affecting pregnancy in third trimester    Hypothyroid in pregnancy, antepartum, third trimester    37 weeks gestation of pregnancy    Chorioamnionitis in third trimester    PLTCS (gen) 7/27/21 F Apg 5/8 Wt 6#6    Post-operative state       Today she is doing well without any chief complaint. Her lochia is light. She denies chest pain, shortness of breath, headache, lightheadedness, blurred vision and peripheral edema. She is breast feeding and she denies any signs or symptoms of mastitis. She is ambulating well. She is voiding without difficulty. She currently denies S/S of postpartum depression. Flatus present. Bowel movement absent. She is tolerating solids.     Vital Signs:  Vitals:    07/30/21 1600 07/30/21 2030 07/31/21 0015 07/31/21 0400   BP: 133/80 (!) 144/90 128/83 123/80   Pulse: 98 91 97 89   Resp: 18 18 18 18   Temp:  98.2 °F (36.8 °C) 98.2 °F (36.8 °C) 98 °F (36.7 °C)   TempSrc:  Oral Oral Oral   SpO2: 100% 100%     Weight:             Urine Input & Output last 24hrs:   No intake or output data in the 24 hours ending 07/31/21 0454    Physical Exam:  General:  no apparent distress, alert and cooperative  Neurologic:  alert, oriented, normal speech, no focal findings or movement disorder noted  Lungs:  No increased work of breathing, good air exchange, clear to auscultation bilaterally, no crackles or wheezing  Heart:  Regular rate and rhythm, normal S1 and S2, no S3 or S4, and no murmur noted    Abdomen: abdomen soft, non-distended, non-tender, bowel sounds present   Fundus: non-tender, firm, below umbilicus  Incision: Prevena in place and functioning appropriately  Extremities:  no calf tenderness, non edematous    Labs:  Lab Results   Component Value Date    WBC 15.5 (H) 2021    HGB 9.8 (L) 2021    HCT 33.3 (L) 2021    MCV 87.2 2021     2021       Assessment/Plan:  1. Staci Romberg is a  POD # 4 s/p PLTCS   - Doing well, VSS    - Female infant in NICU   - Encourage ambulation and use of incentive spirometer   - S/p huerta catheter and saline lock IV on POD #1    - Hgb 11.7>9.1>9.8 post-operatively. Will DC home with PO Iron  2. Rh positive/Rubella immune  3. Breast feeding  - Denies s/s mastitis  4. Chorioamnionitis   - S/p amp/gent/clinda 24 hours   - VSS, Afebrile last fever on    - Uterine Cx gram + cocci in pairs   - WBC 18.5>15.5  - Asymptomatic  5. cHTN   - BP intermittently elevated but non severe  - Labetalol 100 BID   - PreE labs wnl x1, P/C 0.16   - Denies s/s PreE  6. Hypothyroidism  - Patient not on medication   - Last TSH 0.09, T4 1.07 21  - Clinically asymptomatic   - Encourage close follow PP  7. BMI 35  - SCDs for DVT prophylaxis  8. Continue post-op care. Counseling Completed:  Secondary Smoke risks and Sudden Infant Death Syndrome were reviewed with recommendations. Infant sleeping, \"back to sleep\" and avoidance of co-sleeping recommendations were reviewed. Signs and Symptoms of Post Partum Depression were reviewed. The patient is to call if any occur. Signs and symptoms of Mastitis were reviewed. The patient is to call if any occur for follow up.   Discharge instructions including pelvic rest, incision care, 15 lb weight restriction, no driving with pain medicine and office follow-up were reviewed with patient     Attending Physician: Dr. Fausto Goyal, [place]  Ob/Gyn Resident  2021, 4:54 AM

## 2021-07-31 NOTE — LACTATION NOTE
Through  mom reports that her milk supply is in. Baby is currently taking bottles well, but won't latch on breast. Mom has contacted [facility] and they have not gotten back to her with a Maltese speaking peer counselor. Reviewed only attempting baby on breast 2-4 times a day, as baby has been sleepy, along with weight loss and jaundice. No questions at this time.

## 2021-07-31 NOTE — FLOWSHEET NOTE
Discharge instructions/handouts and PBWS provided in Kyrgyz to patient. Patient to review educational material in Kyr[place]z and we will discuss with interpretor per her request.  Ahmet Palma gives patient medications as prescribed by her physician from [address] to [facility]. Patient will courtesy stay because baby has not been discharged from NICU physicians. Baby will continue to be monitored by NICU physicians, but remain in well infant nursery. Baby will be weighed on night shift this evening and a bili serum will be drawn 8/1 in the am.  Mom encouraged to continue to offer breast feeding/bottle every 3-4 hours. Mom has no concerns at this time.

## 2021-07-31 NOTE — PROGRESS NOTES
CLINICAL PHARMACY NOTE: MEDS TO BEDS    Total # of Prescriptions Filled: 6   The following medications were delivered to the patient:  · Labetalol 100mg  · Iron 325mg  · Percocet 5-325mg  · Ibuprofen 600mg  · Ondansetron ODT 4mg  · Colace 100mg    Additional Documentation: delivered to RN 7/31 at 11:02am. No co-pay. Patient in room 735 speaks [place] that is why left with RN.

## 2021-08-01 LAB
CULTURE: ABNORMAL
CULTURE: ABNORMAL
DIRECT EXAM: ABNORMAL
DIRECT EXAM: ABNORMAL
Lab: ABNORMAL
SPECIMEN DESCRIPTION: ABNORMAL

## 2021-08-03 ENCOUNTER — TELEPHONE (OUTPATIENT)
Dept: PEDIATRIC NEPHROLOGY | Age: 26
End: 2021-08-03

## 2021-08-03 NOTE — TELEPHONE ENCOUNTER
Humberto rec'd call from mom who is [place] speaking. Mom states she has contacted [facility] and has not been contacted by them. Humberto called and spoke with the [facility] program at the [facility] [phone]. The nurse stated she did not see the mom's name in the system. Humberto asked how is mom suppose to communicate since she is [place] speaking and the nurse stated mom can call tomorrow morning and she will answer and will then transfer the call to the  who is bi-lingual.  The nurse stated mom would need verifications to be turned in and a list given verbally to writer. Mom verbalized understanding to call tomorrow and understood the need for verifications. Mom will call the Health Dept [facility] tomorrow.

## 2021-08-05 ENCOUNTER — POSTPARTUM VISIT (OUTPATIENT)
Dept: OBGYN CLINIC | Age: 26
End: 2021-08-05

## 2021-08-05 ENCOUNTER — TELEPHONE (OUTPATIENT)
Dept: OTHER | Age: 26
End: 2021-08-05

## 2021-08-05 VITALS
SYSTOLIC BLOOD PRESSURE: 131 MMHG | WEIGHT: 194 LBS | BODY MASS INDEX: 32.28 KG/M2 | HEART RATE: 95 BPM | DIASTOLIC BLOOD PRESSURE: 80 MMHG

## 2021-08-05 PROCEDURE — 99024 POSTOP FOLLOW-UP VISIT: CPT | Performed by: CLINICAL NURSE SPECIALIST

## 2021-08-05 RX ORDER — CEPHALEXIN 500 MG/1
CAPSULE ORAL
COMMUNITY
Start: 2021-08-03

## 2021-08-05 ASSESSMENT — ENCOUNTER SYMPTOMS
ALLERGIC/IMMUNOLOGIC NEGATIVE: 1
EYES NEGATIVE: 1
RESPIRATORY NEGATIVE: 1
GASTROINTESTINAL NEGATIVE: 1

## 2021-08-05 NOTE — TELEPHONE ENCOUNTER
Pt called Humberto in [facility] [place] stating she and  qualified for [address] but had to have one last phone call from [address]. Pt reports he phone has broken and was afraid she would not be able to communicate with [address]. Mom asked for assistance and asked that writer call the Health department to inform them that her phone was broke and to reach mom at different phn number. Sw gave mom phn number to [address] [phone] also.

## 2021-08-05 NOTE — PROGRESS NOTES
ABHIJEET Lizarraga is a 27 y.o. female  presents for her 1 week postpartum C/S delivery visit. Patient reports that she is having regular bowel movements, and is urinating without difficulty. Patient denies any incisional concerns. Patient states that her bleeding is very little. Patient is breast feeding by pumping. Patient denies any mastitis. Patient denies any postpartum depression/baby blues, no suicidal, or homicidal thoughts, and has good support system. Patient denies any pain at this time.  was used through the [address] [place] [address].     OB History    Para Term  AB Living   2 1 1   1 1   SAB TAB Ectopic Molar Multiple Live Births     1     0 1      # Outcome Date GA Lbr Valente/2nd Weight Sex Delivery Anes PTL Lv   2 Term 21 37w1d  6 lb 6.7 oz (2.91 kg) F CS-LTranv EPI, Spinal, Gen N MESHA      Complications: Chorioamnionitis, Fetal Intolerance   1 TAB              Blood pressure 131/80, pulse 95, weight 194 lb (88 kg), last menstrual period 11/15/2020, unknown if currently breastfeeding. Patient Active Problem List    Diagnosis Date Noted    PLTCS (gen) 21 F Apg 5/8 Wt 6#6 2021     Priority: High    Post-operative state     Chorioamnionitis in third trimester     37 weeks gestation of pregnancy 2021    Hypertension affecting pregnancy in third trimester 2021    Hypothyroid in pregnancy, antepartum, third trimester 2021    Abnormal quad screen 03/10/2021        Diagnosis Orders   1. Postpartum care following  delivery       Vitals:    21 1341   BP: 131/80   Pulse: 95        Review of Systems   Constitutional: Negative for chills and fever. HENT: Negative. Eyes: Negative. Respiratory: Negative. Cardiovascular: Negative. Gastrointestinal: Negative. Endocrine: Negative. Genitourinary: Positive for vaginal bleeding (very light). Negative for dysuria. Musculoskeletal: Negative.     Skin: Positive for wound (has RAFFY drsg on and intact). Allergic/Immunologic: Negative. Neurological: Negative. Hematological: Negative. Psychiatric/Behavioral: Negative. Physical Exam  Vitals reviewed. Constitutional:       Appearance: She is well-developed. HENT:      Head: Normocephalic and atraumatic. Eyes:      Conjunctiva/sclera: Conjunctivae normal.   Cardiovascular:      Rate and Rhythm: Normal rate and regular rhythm. Pulmonary:      Effort: Pulmonary effort is normal.      Breath sounds: Normal breath sounds. Abdominal:      General: Bowel sounds are normal.   Musculoskeletal:         General: Normal range of motion. Cervical back: Normal range of motion and neck supple. Skin:     General: Skin is warm and dry. Comments: RAFFY drsg removed , incision is healing well. Neurological:      Mental Status: She is oriented to person, place, and time. Psychiatric:         Behavior: Behavior normal.         Thought Content: Thought content normal.         Judgment: Judgment normal.        Assessment       Diagnosis Orders   1. Postpartum care following  delivery         Plan      Instructed patient on how to care for incision with use of IPAD  and patient verbalized understanding. Follow up in 1 wk.     Electronically signed by: Yousif Jones CNP

## 2021-08-05 NOTE — PROGRESS NOTES
Postpartum Visit: Patient is here today for postpartum  delivery. I have fully reviewed the prenatal and intrapartum course. She is 1 weeks postpartum. Patient is breast and bottle feeding. Her bleeding is light. Patient's pain is 0 out of 10 on the pain scale. Patient is not sexually active. Current contraception method is abstinence. Postpartum depression screening questionnaire provided to patient and is negative.

## 2021-09-23 ENCOUNTER — TELEPHONE (OUTPATIENT)
Dept: OBGYN CLINIC | Age: 26
End: 2021-09-23

## (undated) DEVICE — TOWEL SURG W16XL26IN WHT NONFENESTRATED ST 2 PER PK

## (undated) DEVICE — GLOVE SURG SZ 7 THK118MIL BLK LTX FREE POLYISOPRENE BEAD CUF

## (undated) DEVICE — KENDALL SCD EXPRESS SLEEVES, KNEE LENGTH, MEDIUM: Brand: KENDALL SCD

## (undated) DEVICE — SUTURE VCRL SZ 2-0 L36IN ABSRB VLT L36MM CT-1 1/2 CIR J345H